# Patient Record
Sex: FEMALE | Race: WHITE | NOT HISPANIC OR LATINO | Employment: FULL TIME | URBAN - METROPOLITAN AREA
[De-identification: names, ages, dates, MRNs, and addresses within clinical notes are randomized per-mention and may not be internally consistent; named-entity substitution may affect disease eponyms.]

---

## 2020-03-27 ENCOUNTER — VIRTUAL VISIT (OUTPATIENT)
Dept: FAMILY MEDICINE | Facility: OTHER | Age: 40
End: 2020-03-27

## 2020-03-27 NOTE — PROGRESS NOTES
"Date: 2020 09:25:42  Clinician: Jacinta Knott  Clinician NPI: [id number]  Patient: Kathya Mathews  Patient : 1980  Patient Address: [address] [place] [address], [place] [address]  Patient Phone: [phone]  Visit Protocol: URI  Patient Summary:  Kathya is a 39 year old ( : 1980 ) female who initiated a Visit for COVID-19 (Coronavirus) evaluation and screening. When asked the question \"Please sign me up to receive news, health information and promotions from [employer].\", Kathya responded \"No\".    Kathya states her symptoms started gradually 10-13 days ago. After her symptoms started, they improved and then got worse again.   Her symptoms consist of a sore throat, malaise, and myalgia. Kathya also feels feverish.   Symptom details     Sore throat: Kathya reports having mild throat pain (1-3 on a 10 point pain scale), does not have exudate on her tonsils, and can swallow liquids. She is not sure if the lymph nodes in her neck are enlarged. A rash has not appeared on the skin since the sore throat started.     Temperature: Her current temperature is 97.3 degrees Fahrenheit.      Kathya denies having ear pain, rhinitis, wheezing, cough, nasal congestion, teeth pain, headache, facial pain or pressure, and chills. She also denies taking antibiotic medication for the symptoms, having recent facial or sinus surgery in the past 60 days, and having a sinus infection within the past year. She is not experiencing dyspnea.   Precipitating events  Within the past week, Kathya has not been exposed to someone with strep throat. She has not recently been exposed to someone with influenza. Kathya has been in close contact with the following high risk individuals: people with asthma, heart disease or diabetes and adults 65 or older.   Pertinent COVID-19 (Coronavirus) information  Kathya has not traveled internationally or to the areas where COVID-19 (Coronavirus) is widespread, including cruise ship travel " in the last 14 days before the start of her symptoms.   Kathya has had a close contact with a laboratory-confirmed COVID-19 patient within 14 days of symptom onset. She also has had a close contact with a suspected COVID-19 patient within 14 days of symptom onset. Additional information about contact with COVID-19 (Coronavirus) patient as reported by the patient (free text): My brother in law has tested positive. I have had indirect contact through multiple family members multiple times.   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 She provides direct patient care. She does not live with a healthcare worker.   Pertinent medical history  Kathya does not get yeast infections when she takes antibiotics.   Kathya does not need a return to work/school note.   Weight: 132 lbs   Kathya does not smoke or use smokeless tobacco.   She denies pregnancy and denies breastfeeding. She has menstruated in the past month.   Additional information as reported by the patient (free text): GI upset   Weight: 132 lbs    MEDICATIONS: calcium-vitamin D3-vitamin K oral, iron-vit C-vit B12-folic acid oral, bupropion HCl oral, ALLERGIES: Compazine  Clinician Response:  Dear Kathya,  Based on the information provided, you have acute bacterial sinusitis, also known as a sinus infection. Sinus infections are caused by bacteria or a virus and symptoms are almost always identical. The difference between the 2 types of infections is timing.  Sinus infections start as viral infections and symptoms improve on their own in about 7 days. If symptoms have not improved after 7 days or have even worsened, a bacterial infection may have developed.  Medication information  I am prescribing:     Amoxicillin 500 mg oral tablet. Take 1 tablet by mouth every 8 hours for 7 days. There are no refills with this prescription.   Yeast infections can be a common side effect of antibiotics. The most common symptom  of a yeast infection is itchiness in and around the vagina. Other signs and symptoms include burning, redness, or a thick, white vaginal discharge that looks like cottage cheese and does not have a bad smell.  If you become pregnant during this course of treatment, stop taking the medication and contact your primary care provider.  Self care  Steps you can take to be as comfortable as possible:     Rest.    Drink plenty of water and other liquids.    Use throat lozenges.    Gargle with warm salt water (1/4 teaspoon of salt per 8 ounce glass of water).    Suck on frozen items such as popsicles or ice cubes.    Drink hot tea with lemon and honey.     When to seek care  Please be seen in a clinic or urgent care if any of the following occur:     Symptoms do not start to improve after 3 days of treatment    New symptoms develop, or symptoms become worse     Please be seen in a clinic or urgent care if new symptoms develop, or symptoms become worse.  It is possible to have an allergic reaction to an antibiotic even if you have not had one in the past. If you notice a new rash, significant swelling, or difficulty breathing, stop taking this medication immediately and go to a clinic or urgent care.  Call 911 or go to the emergency room if you feel that your throat is closing off, you suddenly develop a rash, you are unable to swallow fluids, you are drooling, or you are having difficulty breathing.  Additional treatment plan   Based on the information you have provided, you do have symptoms that are consistent with Coronavirus (COVID-19).  The coronavirus causes mild to severe respiratory illness with the most common symptoms including fever, cough and difficulty breathing. Unfortunately, many viruses cause similar symptoms and it can be difficult to distinguish between viruses, especially in mild cases, so we are presuming that anyone with cough or fever has coronavirus at this time.  Coronavirus/COVID-19 has reached the  point of community spread in [place], meaning that we are finding the virus in people with no known exposure risk for nigel the virus. Given the increasing commonness of coronavirus in the community we are no longer testing patients who are not critically ill.  If you are a health care worker, you should refer to your employee health office for instructions about testing and returning to work.  For everyone else who has cough or fever, you should assume you are infected with coronavirus. Since you will not be tested but have symptoms that may be consistent with coronavirus, the CDC recommends you stay in self-isolation until these three things have happened:    You have had no fever for at least 72 hours (that is three full days of no fever without the use of medicine that reduces fevers)    AND   Other symptoms have improved (for example, when your cough or shortness of breath have improved)   AND   At least 7 days have passed since your symptoms first appeared.   How to Isolate:   Isolate yourself at home.  Do Not allow any visitors  Do Not go to work or school  Do Not go to [facility],  centers, shopping, or other public places.  Do Not shake hands.  Avoid close contact with others (hugging, kissing).   Protect Others:   Cover Your Mouth and Nose with a mask, disposable tissue or wash cloth to avoid spreading germs to others.  Wash your hands and face frequently with soap and water.   We know it can be scary to hear that you might have COVID-19. Our team can help track your symptoms and make sure you are doing ok over the next two weeks using a program called [employer] to keep in touch. When you receive an email from [employer], please consider enrolling in our monitoring program. There is no cost to you for monitoring. Here is a URL where you can learn more: [email or web address]  Managing Symptoms:   At this time, we primarily recommend Tylenol (Acetaminophen) for fever or pain. If  you have liver or kidney problems, contact your primary care provider for instructions on use of tylenol. Adults can take 650 mg (two 325 mg pills) by mouth every 4-6 hours as needed OR 1,000 mg (two 500 mg pills) every 8 hours as needed. MAXIMUM DAILY DOSE: 3,000mg. For children, refer to dosing on bottle based on age or weight.   If you develop significant shortness of breath that prevents you from doing normal activities, please call 911 or proceed to the nearest emergency room and alert them immediately that you have been in self-isolation for possible coronavirus.  If you have a higher risk medical condition such as cancer, heart failure, end stage renal disease on dialysis or have a transplant, please reach out to your specialist's clinic to advise them of your [facility] visit should you not improve within the next two days.   For more information about COVID19 and options for caring for yourself at home, please visit the CDC website at https://www.cdc.gov/coronavirus/2019-ncov/about/steps-when-sick.htmlFor more options for care at [facility], please visit our website at [email or web address]    Diagnosis: Cough  Diagnosis ICD: R05  Prescription: amoxicillin 500 mg oral tablet 21 tablet, 7 days supply. Take 1 tablet by mouth every 8 hours for 7 days. Refills: 0, Refill as needed: no, Allow substitutions: yes

## 2020-05-04 ENCOUNTER — TELEPHONE (OUTPATIENT)
Dept: ORTHOPEDICS | Facility: CLINIC | Age: 40
End: 2020-05-04

## 2020-05-04 ENCOUNTER — MYC MEDICAL ADVICE (OUTPATIENT)
Dept: ORTHOPEDICS | Facility: CLINIC | Age: 40
End: 2020-05-04

## 2020-05-04 NOTE — TELEPHONE ENCOUNTER
Patient was scheduled by  for 5/6/20. Patient reports left hip pain for 2 or 3 weeks with running. She reports no injury and has never hand this pain before. She has not previously seen a physician or had previous treatment. No images completed. She reports minimal to no pain at rest or with walking. She only has pain with running. She is used to running 80 hours per week. She denies radiating pain or numbness and tingling. Patient was offered an initial video visit to establish treatment.     Joshua Sparks ATC, LAT

## 2020-05-06 ENCOUNTER — VIRTUAL VISIT (OUTPATIENT)
Dept: ORTHOPEDICS | Facility: CLINIC | Age: 40
End: 2020-05-06
Payer: COMMERCIAL

## 2020-05-06 ENCOUNTER — ANCILLARY PROCEDURE (OUTPATIENT)
Dept: GENERAL RADIOLOGY | Facility: CLINIC | Age: 40
End: 2020-05-06
Attending: FAMILY MEDICINE
Payer: COMMERCIAL

## 2020-05-06 VITALS — HEIGHT: 68 IN | BODY MASS INDEX: 19.7 KG/M2 | WEIGHT: 130 LBS

## 2020-05-06 DIAGNOSIS — M25.552 LEFT HIP PAIN: ICD-10-CM

## 2020-05-06 DIAGNOSIS — M25.552 LEFT HIP PAIN: Primary | ICD-10-CM

## 2020-05-06 PROCEDURE — 99202 OFFICE O/P NEW SF 15 MIN: CPT | Mod: 95 | Performed by: FAMILY MEDICINE

## 2020-05-06 ASSESSMENT — MIFFLIN-ST. JEOR: SCORE: 1303.18

## 2020-05-06 NOTE — PROGRESS NOTES
"Kathya Mathews is a 39 year old female who is being evaluated via a billable video visit.      The patient has been notified of following:     \"This video visit will be conducted via a call between you and your physician/provider. We have found that certain health care needs can be provided without the need for an in-person physical exam.  This service lets us provide the care you need with a video conversation.  If a prescription is necessary we can send it directly to your pharmacy.  If lab work is needed we can place an order for that and you can then stop by our lab to have the test done at a later time.    Video visits are billed at different rates depending on your insurance coverage.  Please reach out to your insurance provider with any questions.    If during the course of the call the physician/provider feels a video visit is not appropriate, you will not be charged for this service.\"    Patient has given verbal consent for Video visit? Yes    How would you like to obtain your AVS? Khadra    Patient would like the video invitation sent by: Text to cell phone: [phone]    Will anyone else be joining your video visit? No      SUBJECTIVE  Kathya Mathews is a/an 39 year old female who is seen as a self referral for evaluation of left hip pain. She tried to continue running. 80 miles per week for 2 months when the foot pain started.  Pt restarted running in 8-9/19.  Went up from 70->80 mile.    Onset: 6 week(s) ago. Patient describes injury as a running injury  Location of Pain: left anterior hip - sometime radiating down the thigh.    Pt had Covid like sx  Rating of Pain at worst: 7/10  Worsened by: running: full extension after toe off - rebound pain after standing on the leg to put socks on, after activity, in the morning.  Pt can run stairs with a small amount of pain for an hour.  Some pain with ambulation.  Pain with push up.  No pain with resisted flexion.    Better with: rest and \"warms " "up\" with activity,   Treatments tried: resting for a couple weeks, buprofen (helped initially), rolling out over lateral and posterior hip, TENs unit  Quality: aching, sharp  Associated symptoms: no distal numbness or tingling; denies swelling or warmth  Orthopedic history: NO  Relevant surgical history: Sx to right foot 5/2019  No past medical history on file.    HO Stress Fx:  HO stress fx x2 when running at the [employer]  Weight Changes:  No sig changes  LMP: Pt has HO couple of mon Dec/Jan but regular since.  Usually pretty regular    Pt concerned that she cannot run.  Pt was doing some hip work including abduction.     Assessment/Plan: Pt is a 38 yo f presenting for a 6 wk HO left ant hip pain preventing her from running.  Pt  has HO stress fractures in college, none recently with some menstrual irregularities with IUD in place.  Given this concern for possible stress fx.  Hip XR showing mild degenerative changes.  Differential including labral pathology vs stress fx vs mild OA.  Given pt is a high mileage runner will plan to order left hip arthrogram.  Will contact pt with results with plan pending results.  Pt understands and agrees with plan.    Jalen Khan MD        Video-Visit Details    Type of service:  Video Visit    Video Start Time: 9:17 AM  Video End Time: 9:38 AM    Originating Location (pt. Location): Home    Distant Location (provider location):  [facility]     Platform used for Video Visit: Radha Khan MD          "

## 2020-05-06 NOTE — PATIENT INSTRUCTIONS
Diagnosis: Left Hip Pain  Image Findings: Plan for X-Ray    Treatment: Can do stairs as tolerated, home exercises,  Job: No restrictions  Medications: Limited tylenol/ibuprofen for pain for 1-2 weeks  Follow-up: Will call with x-ray results, plan to order MRI to further eval if neg    [email or web address]    MRI Scheduling Appointments  Call: [phone]  Toll-Free: [phone]  Fax: [phone]

## 2020-05-14 ENCOUNTER — TELEPHONE (OUTPATIENT)
Dept: ORTHOPEDICS | Facility: CLINIC | Age: 40
End: 2020-05-14

## 2020-05-14 ENCOUNTER — HOSPITAL ENCOUNTER (OUTPATIENT)
Dept: MRI IMAGING | Facility: CLINIC | Age: 40
End: 2020-05-14
Attending: FAMILY MEDICINE
Payer: COMMERCIAL

## 2020-05-14 ENCOUNTER — HOSPITAL ENCOUNTER (OUTPATIENT)
Dept: GENERAL RADIOLOGY | Facility: CLINIC | Age: 40
End: 2020-05-14
Attending: FAMILY MEDICINE
Payer: COMMERCIAL

## 2020-05-14 VITALS — SYSTOLIC BLOOD PRESSURE: 109 MMHG | OXYGEN SATURATION: 100 % | HEART RATE: 63 BPM | DIASTOLIC BLOOD PRESSURE: 83 MMHG

## 2020-05-14 DIAGNOSIS — M84.352A STRESS FRACTURE OF NECK OF LEFT FEMUR: Primary | ICD-10-CM

## 2020-05-14 DIAGNOSIS — M25.552 LEFT HIP PAIN: ICD-10-CM

## 2020-05-14 PROCEDURE — 25000125 ZZHC RX 250: Performed by: FAMILY MEDICINE

## 2020-05-14 PROCEDURE — 27093 INJECTION FOR HIP X-RAY: CPT

## 2020-05-14 PROCEDURE — A9585 GADOBUTROL INJECTION: HCPCS | Performed by: FAMILY MEDICINE

## 2020-05-14 PROCEDURE — 25000128 H RX IP 250 OP 636: Performed by: FAMILY MEDICINE

## 2020-05-14 PROCEDURE — 73722 MRI JOINT OF LWR EXTR W/DYE: CPT | Mod: LT

## 2020-05-14 PROCEDURE — 25500064 ZZH RX 255 OP 636: Performed by: FAMILY MEDICINE

## 2020-05-14 RX ORDER — EPINEPHRINE 1 MG/ML
0.1 INJECTION, SOLUTION, CONCENTRATE INTRAVENOUS ONCE
Status: COMPLETED | OUTPATIENT
Start: 2020-05-14 | End: 2020-05-14

## 2020-05-14 RX ORDER — IOPAMIDOL 408 MG/ML
10 INJECTION, SOLUTION INTRATHECAL ONCE
Status: COMPLETED | OUTPATIENT
Start: 2020-05-14 | End: 2020-05-14

## 2020-05-14 RX ORDER — GADOBUTROL 604.72 MG/ML
0.1 INJECTION INTRAVENOUS ONCE
Status: COMPLETED | OUTPATIENT
Start: 2020-05-14 | End: 2020-05-14

## 2020-05-14 RX ORDER — LIDOCAINE HYDROCHLORIDE 10 MG/ML
30 INJECTION, SOLUTION EPIDURAL; INFILTRATION; INTRACAUDAL; PERINEURAL ONCE
Status: COMPLETED | OUTPATIENT
Start: 2020-05-14 | End: 2020-05-14

## 2020-05-14 RX ADMIN — GADOBUTROL 0.1 MG: 604.72 INJECTION INTRAVENOUS at 12:10

## 2020-05-14 RX ADMIN — EPINEPHRINE 0.1 MG: 1 INJECTION, SOLUTION, CONCENTRATE INTRAVENOUS at 12:11

## 2020-05-14 RX ADMIN — LIDOCAINE HYDROCHLORIDE 2 ML: 10 INJECTION, SOLUTION EPIDURAL; INFILTRATION; INTRACAUDAL; PERINEURAL at 11:50

## 2020-05-14 RX ADMIN — IOPAMIDOL 4 ML: 408 INJECTION, SOLUTION INTRATHECAL at 12:09

## 2020-05-14 NOTE — TELEPHONE ENCOUNTER
Patient contacted via telephone regarding left hip MRI results.  Given findings plan to treat with rest from running for total of 4 weeks.  Plan to have pt follow-up with PCP for lab studies.  Recommend possible evaluation with a nutritionist given she has had menstrual irregularities as well as femoral neck stress fracture.  Will follow-up in two weeks.      Jalen Khan MD

## 2020-05-15 ENCOUNTER — TELEPHONE (OUTPATIENT)
Dept: ORTHOPEDICS | Facility: CLINIC | Age: 40
End: 2020-05-15

## 2020-05-15 DIAGNOSIS — M79.671 RIGHT FOOT PAIN: Primary | ICD-10-CM

## 2020-05-15 DIAGNOSIS — M84.352A STRESS FRACTURE OF NECK OF LEFT FEMUR: ICD-10-CM

## 2020-05-15 NOTE — TELEPHONE ENCOUNTER
Pt contacted regarding f/u on right foot pain.  She did see someone in 2/20 for this no imaging but concern for stress fracture given recent left femoral neck stress fx.  Given this will order right foot xr and follow-up with results afterwards.  Pt planning to see PCP for lab work up given HO stress fx.  She was advised to possibly get a DEXA scan as well.  Pt understands and agrees with plan.    Jalen Khan MD

## 2020-05-19 ENCOUNTER — ANCILLARY PROCEDURE (OUTPATIENT)
Dept: GENERAL RADIOLOGY | Facility: CLINIC | Age: 40
End: 2020-05-19
Attending: FAMILY MEDICINE
Payer: COMMERCIAL

## 2020-05-19 DIAGNOSIS — M79.671 RIGHT FOOT PAIN: ICD-10-CM

## 2020-05-19 LAB — RADIOLOGIST FLAGS: NORMAL

## 2020-05-20 ENCOUNTER — TELEPHONE (OUTPATIENT)
Dept: ORTHOPEDICS | Facility: CLINIC | Age: 40
End: 2020-05-20

## 2020-05-20 DIAGNOSIS — S92.331A CLOSED DISPLACED FRACTURE OF THIRD METATARSAL BONE OF RIGHT FOOT, INITIAL ENCOUNTER: Primary | ICD-10-CM

## 2020-05-20 NOTE — TELEPHONE ENCOUNTER
Contacted pt regarding findings of right 3rd metatarsal fx.  Order for post-op shoe vs short CAM boot placed.  Pt advised that increased stress on left hip may require crutches.  Plan to f/u in 1-2 weeks to go over symptoms.  Pt understands and agrees with plan.    Jalen Khan MD

## 2020-05-28 ENCOUNTER — OFFICE VISIT (OUTPATIENT)
Dept: ORTHOPEDICS | Facility: CLINIC | Age: 40
End: 2020-05-28
Payer: COMMERCIAL

## 2020-05-28 DIAGNOSIS — S92.331A CLOSED DISPLACED FRACTURE OF THIRD METATARSAL BONE OF RIGHT FOOT, INITIAL ENCOUNTER: Primary | ICD-10-CM

## 2020-05-28 DIAGNOSIS — M84.352A STRESS FRACTURE OF NECK OF LEFT FEMUR: ICD-10-CM

## 2020-05-28 PROCEDURE — 99214 OFFICE O/P EST MOD 30 MIN: CPT | Performed by: FAMILY MEDICINE

## 2020-05-28 NOTE — PATIENT INSTRUCTIONS
Diagnosis: Left hip stress fracture, right 3rd metatarsal fracture  Treatment: No running, no biking, swimming if possible, ski erg with no hip flexion  Follow-up with PCP for Vit D, BMP, CBC, TSH, DEXA scan  Follow-up with nutritionist    Follow-up: 2 weeks    It was great seeing you again today!    Jalen Khan

## 2020-05-28 NOTE — LETTER
5/28/2020         RE: Kathya Mathews  [address]  [facility] [address]        Dear Colleague,    Thank you for referring your patient, Kathya Mathews, to the [facility]. Please see a copy of my visit note below.    ASSESSMENT & PLAN  Kathya was seen today for pain.    Diagnoses and all orders for this visit:    Closed displaced fracture of third metatarsal bone of right foot, initial encounter    Stress fracture of neck of left femur      Patient is a 39 year old female presenting for follow-up of   Chief Complaint   Patient presents with     Left Hip - Pain      # Left femoral neck stress fracture:  Notable after running a significant amount over the past several months followed but running a fair amount of stairs as well.  Hip MRI on 5/14/20 showing active compression sided femoral neck stress fracture.  No pain on examination today.  Pt is a high mileage runner and will likely need 8 weeks of rest prior to resumption of running.      # Right Displaced 3rd Metatarsal Stress Fracture: Evidence of healing on XR last week.  Pt still has TTP over fx site as well as pos metatarsal squeeze.  Plan to cont boot for 2 weeks and reevaluate.     # Relative Energy Deficiency:  Pt has had HO disordered eating in college.  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  Pt advised again today to seek advice from a nutritionist to safely increase caloric intake to compensate for the high mileage running.  She also was recommended again to undergo lab/testing to evaluate for sequelae including at least CBC, CMP, TSH, Vit D as well as a DEXA scan.  Pt wants to have this done by her PCP.  She was also asked about mental health including depression/anxiety.  Pt reported sx of depression after finding out she had a femoral neck stress fracture but wants     Treatment: Cont CAM boot, consider repeat MRI for left femoral neck  "stress fracture, metabolic work-up as above recommended  Activity: Pt can start ski erg workouts  Physical Therapy plan to start when healed  Medications none    Concerning signs/sx that would warrant urgent evaluation were discussed.  All questions were answered, patient understands and agrees with plan.      Return in about 2 weeks (around 6/11/2020).    -----    SUBJECTIVE  Kathya Mathews is a/an 39 year old female who is seen as a self referral for evaluation of left hip pain. The patient is seen by themselves.  Pt has HO stress fractures last one in college 20 yrs ago.  Pt had been ramping up running earlier this year from 70-80 mi/week.  She had seen a nutritionist last year x1 but no management of diet to compensate for the large energy demands of the amount of running she was doing.    Onset: 8 week(s) ago. Patient describes injury as running injury pain noted around mid-Feb  Location of Pain: left hip, right foot   Rating of Pain at worst:significant  Rating of Pain Currently: 0/10  Worsened by: running: full extension after toe off - rebound pain after standing on the leg to put socks on, after activity, in the morning.  Pt can run stairs with a small amount of pain for an hour.  Some pain with ambulation.  Pain with push up.  No pain with resisted flexion.    Better with: rest and \"warms up\" with activity  Treatments tried: resting for a couple weeks, buprofen (helped initially), rolling out over lateral and posterior hip, TENs unit  Quality: aching, sharp  Associated symptoms: no distal numbness or tingling; denies swelling or warmth  Orthopedic history: YES - HO Stress Fx:  HO stress fx x2 when running at the [facility]  Relevant surgical history: YES -right foot sx 5/2019    Interim History - May 28, 2020  Since last visit on 5/20/2020 patient has no left hip pain, no pain while in CAM boot.  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  No interim injury.   Pt has " not had blood work or nutrition evaluation scheduled in the interim.  Pt would like to start running again as soon as safely possible.  She is hoping to start light biking    History reviewed. No pertinent past medical history.  Social History     Socioeconomic History     Marital status: Single     Spouse name: Not on file     Number of children: Not on file     Years of education: Not on file     Highest education level: Not on file   Occupational History     Not on file   Social Needs     Financial resource strain: Not on file     Food insecurity     Worry: Not on file     Inability: Not on file     Transportation needs     Medical: Not on file     Non-medical: Not on file   Tobacco Use     Smoking status: Never Smoker     Smokeless tobacco: Never Used   Substance and Sexual Activity     Alcohol use: Not on file     Drug use: Not on file     Sexual activity: Not on file   Lifestyle     Physical activity     Days per week: Not on file     Minutes per session: Not on file     Stress: Not on file   Relationships     Social connections     Talks on phone: Not on file     Gets together: Not on file     Attends [employer] service: Not on file     Active member of club or organization: Not on file     Attends meetings of clubs or organizations: Not on file     Relationship status: Not on file     Intimate partner violence     Fear of current or ex partner: Not on file     Emotionally abused: Not on file     Physically abused: Not on file     Forced sexual activity: Not on file   Other Topics Concern     Not on file   Social History Narrative     Not on file         Patient's past medical, surgical, social, and family histories were reviewed today and no changes are noted.    REVIEW OF SYSTEMS:  10 point ROS is negative other than symptoms noted above in HPI, Past Medical History or as stated below  Constitutional: NEGATIVE for fever, chills, change in weight  Skin: NEGATIVE for worrisome rashes, moles or lesions  GI/:  NEGATIVE for bowel or bladder changes  Neuro: NEGATIVE for weakness, dizziness or paresthesias    OBJECTIVE:  There were no vitals taken for this visit.   General: healthy, alert and in no distress  HEENT: no scleral icterus or conjunctival erythema  Skin: no suspicious lesions or rash. No jaundice.  CV: no pedal edema  Resp: normal respiratory effort without conversational dyspnea   Psych: normal mood and affect  Gait: normal steady gait with appropriate coordination and balance  Neuro: Normal light sensory exam of lower extremity  MSK:  LEFT HIP  Inspection:    No obvious deformity or asymmetry, level pelvis  Palpation:    Nontender.  Active Range of Motion:     Flexion full, IR full, ER  full  Strength:    Flexion 5/5, adduction 5/5, abduction 5/5  Special Tests:    Positive: None    Negative: Logroll, MYRIAM, anterior impingement (FADIR)    RIGHT FOOT  Inspection:    no swelling    Palpation:    Tender about the 3rd metatarsals    No tenderness over the other metatarsals  Range of Motion:     Active toe flexion and extension  - full    Active ankle range of motion - full     Passive ankle range of motion - full  Strength:    Intrinsic foot musculature strength - full    Ankle strength - full  Special Tests:    Positive: MTP stress    Negative: calcaneal squeeze and Lisfranc joint tenderness        Independent visualization of the below image:  No results found for this or any previous visit (from the past 24 hour(s)).    Exam: 3 views of the right foot dated 5/19 2020.     COMPARISON: None.     CLINICAL HISTORY: Right foot pain.     FINDINGS: AP, oblique, and lateral views of the right foot were  obtained. Postsurgical in the right first metatarsal and right great  toe proximal phalanx. Slight lateral displacement of the right great  toe proximal phalanx in relation to the distal aspect of the right  first metatarsal. There is hallux valgus. Fracture involving the  proximal to mid third metatarsal. Partially  visualized plate and screw  fixation in the distal fibula. Os trigonum. The Lisfranc joint is  congruent.                                                                      IMPRESSION:  1. Healing fracture involving the proximal to mid right third  metatarsal.  2. Hallux valgus with surgical changes in the first metatarsal and  great toe proximal phalanx.     [Consider Follow Up: Fracture involving the third metatarsal.]     This report will be copied to the [facility] to ensure a  provider acknowledges the finding.      LAYLA LOVE MD    Patient's conditions were thoroughly discussed during today's visit with greater than 50% of the visit spent counseling the patient with total time spent face-to-face with the patient being 30 minutes.    Jalen Khan MD, [facility]      Again, thank you for allowing me to participate in the care of your patient.        Sincerely,        Jalen Khan MD

## 2020-05-28 NOTE — PROGRESS NOTES
ASSESSMENT & PLAN  Kathya was seen today for pain.    Diagnoses and all orders for this visit:    Closed displaced fracture of third metatarsal bone of right foot, initial encounter    Stress fracture of neck of left femur      Patient is a 39 year old female presenting for follow-up of   Chief Complaint   Patient presents with     Left Hip - Pain      # Left femoral neck stress fracture:  Notable after running a significant amount over the past several months followed but running a fair amount of stairs as well.  Hip MRI on 5/14/20 showing active compression sided femoral neck stress fracture.  No pain on examination today.  Pt is a high mileage runner and will likely need 8 weeks of rest prior to resumption of running.      # Right Displaced 3rd Metatarsal Stress Fracture: Evidence of healing on XR last week.  Pt still has TTP over fx site as well as pos metatarsal squeeze.  Plan to cont boot for 2 weeks and reevaluate.     # Relative Energy Deficiency:  Pt has had HO disordered eating in college.  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  Pt advised again today to seek advice from a nutritionist to safely increase caloric intake to compensate for the high mileage running.  She also was recommended again to undergo lab/testing to evaluate for sequelae including at least CBC, CMP, TSH, Vit D as well as a DEXA scan.  Pt wants to have this done by her PCP.  She was also asked about mental health including depression/anxiety.  Pt reported sx of depression after finding out she had a femoral neck stress fracture but wants     Treatment: Cont CAM boot, consider repeat MRI for left femoral neck stress fracture, metabolic work-up as above recommended  Activity: Pt can start ski erg workouts  Physical Therapy plan to start when healed  Medications none    Concerning signs/sx that would warrant urgent evaluation were discussed.  All questions were answered,  "patient understands and agrees with plan.      Return in about 2 weeks (around 6/11/2020).    -----    SUBJECTIVE  Kathya Mathews is a/an 39 year old female who is seen as a self referral for evaluation of left hip pain. The patient is seen by themselves.  Pt has HO stress fractures last one in college 20 yrs ago.  Pt had been ramping up running earlier this year from 70-80 mi/week.  She had seen a nutritionist last year x1 but no management of diet to compensate for the large energy demands of the amount of running she was doing.    Onset: 8 week(s) ago. Patient describes injury as running injury pain noted around mid-Feb  Location of Pain: left hip, right foot   Rating of Pain at worst:significant  Rating of Pain Currently: 0/10  Worsened by: running: full extension after toe off - rebound pain after standing on the leg to put socks on, after activity, in the morning.  Pt can run stairs with a small amount of pain for an hour.  Some pain with ambulation.  Pain with push up.  No pain with resisted flexion.    Better with: rest and \"warms up\" with activity  Treatments tried: resting for a couple weeks, buprofen (helped initially), rolling out over lateral and posterior hip, TENs unit  Quality: aching, sharp  Associated symptoms: no distal numbness or tingling; denies swelling or warmth  Orthopedic history: YES - HO Stress Fx:  HO stress fx x2 when running at the [facility]  Relevant surgical history: YES -right foot sx 5/2019    Interim History - May 28, 2020  Since last visit on 5/20/2020 patient has no left hip pain, no pain while in CAM boot.  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  No interim injury.   Pt has not had blood work or nutrition evaluation scheduled in the interim.  Pt would like to start running again as soon as safely possible.  She is hoping to start light biking    History reviewed. No pertinent past medical history.  Social History     Socioeconomic " History     Marital status: Single     Spouse name: Not on file     Number of children: Not on file     Years of education: Not on file     Highest education level: Not on file   Occupational History     Not on file   Social Needs     Financial resource strain: Not on file     Food insecurity     Worry: Not on file     Inability: Not on file     Transportation needs     Medical: Not on file     Non-medical: Not on file   Tobacco Use     Smoking status: Never Smoker     Smokeless tobacco: Never Used   Substance and Sexual Activity     Alcohol use: Not on file     Drug use: Not on file     Sexual activity: Not on file   Lifestyle     Physical activity     Days per week: Not on file     Minutes per session: Not on file     Stress: Not on file   Relationships     Social connections     Talks on phone: Not on file     Gets together: Not on file     Attends Baptism service: Not on file     Active member of club or organization: Not on file     Attends meetings of clubs or organizations: Not on file     Relationship status: Not on file     Intimate partner violence     Fear of current or ex partner: Not on file     Emotionally abused: Not on file     Physically abused: Not on file     Forced sexual activity: Not on file   Other Topics Concern     Not on file   Social History Narrative     Not on file         Patient's past medical, surgical, social, and family histories were reviewed today and no changes are noted.    REVIEW OF SYSTEMS:  10 point ROS is negative other than symptoms noted above in HPI, Past Medical History or as stated below  Constitutional: NEGATIVE for fever, chills, change in weight  Skin: NEGATIVE for worrisome rashes, moles or lesions  GI/: NEGATIVE for bowel or bladder changes  Neuro: NEGATIVE for weakness, dizziness or paresthesias    OBJECTIVE:  There were no vitals taken for this visit.   General: healthy, alert and in no distress  HEENT: no scleral icterus or conjunctival erythema  Skin: no  suspicious lesions or rash. No jaundice.  CV: no pedal edema  Resp: normal respiratory effort without conversational dyspnea   Psych: normal mood and affect  Gait: normal steady gait with appropriate coordination and balance  Neuro: Normal light sensory exam of lower extremity  MSK:  LEFT HIP  Inspection:    No obvious deformity or asymmetry, level pelvis  Palpation:    Nontender.  Active Range of Motion:     Flexion full, IR full, ER  full  Strength:    Flexion 5/5, adduction 5/5, abduction 5/5  Special Tests:    Positive: None    Negative: Logroll, MYRIAM, anterior impingement (FADIR)    RIGHT FOOT  Inspection:    no swelling    Palpation:    Tender about the 3rd metatarsals    No tenderness over the other metatarsals  Range of Motion:     Active toe flexion and extension  - full    Active ankle range of motion - full     Passive ankle range of motion - full  Strength:    Intrinsic foot musculature strength - full    Ankle strength - full  Special Tests:    Positive: MTP stress    Negative: calcaneal squeeze and Lisfranc joint tenderness        Independent visualization of the below image:  No results found for this or any previous visit (from the past 24 hour(s)).    Exam: 3 views of the right foot dated 5/19 2020.     COMPARISON: None.     CLINICAL HISTORY: Right foot pain.     FINDINGS: AP, oblique, and lateral views of the right foot were  obtained. Postsurgical in the right first metatarsal and right great  toe proximal phalanx. Slight lateral displacement of the right great  toe proximal phalanx in relation to the distal aspect of the right  first metatarsal. There is hallux valgus. Fracture involving the  proximal to mid third metatarsal. Partially visualized plate and screw  fixation in the distal fibula. Os trigonum. The Lisfranc joint is  congruent.                                                                      IMPRESSION:  1. Healing fracture involving the proximal to mid right  third  metatarsal.  2. Hallux valgus with surgical changes in the first metatarsal and  great toe proximal phalanx.     [Consider Follow Up: Fracture involving the third metatarsal.]     This report will be copied to the [facility] to ensure a  provider acknowledges the finding.      LAYLA LOVE MD    Patient's conditions were thoroughly discussed during today's visit with greater than 50% of the visit spent counseling the patient with total time spent face-to-face with the patient being 30 minutes.    Jalen Khan MD, [facility]

## 2020-06-11 ENCOUNTER — OFFICE VISIT (OUTPATIENT)
Dept: ORTHOPEDICS | Facility: CLINIC | Age: 40
End: 2020-06-11
Payer: COMMERCIAL

## 2020-06-11 ENCOUNTER — ANCILLARY PROCEDURE (OUTPATIENT)
Dept: GENERAL RADIOLOGY | Facility: CLINIC | Age: 40
End: 2020-06-11
Attending: FAMILY MEDICINE
Payer: COMMERCIAL

## 2020-06-11 DIAGNOSIS — M84.352A STRESS FRACTURE OF LEFT HIP: ICD-10-CM

## 2020-06-11 DIAGNOSIS — S92.331A CLOSED DISPLACED FRACTURE OF THIRD METATARSAL BONE OF RIGHT FOOT, INITIAL ENCOUNTER: Primary | ICD-10-CM

## 2020-06-11 DIAGNOSIS — S92.331A CLOSED DISPLACED FRACTURE OF THIRD METATARSAL BONE OF RIGHT FOOT, INITIAL ENCOUNTER: ICD-10-CM

## 2020-06-11 PROCEDURE — 99213 OFFICE O/P EST LOW 20 MIN: CPT | Performed by: FAMILY MEDICINE

## 2020-06-11 PROCEDURE — 73630 X-RAY EXAM OF FOOT: CPT | Mod: RT

## 2020-06-11 NOTE — LETTER
6/11/2020         RE: Kathya Mathews  [address]  [facility] [address]        Dear Colleague,    Thank you for referring your patient, Kathya Mathews, to the [facility]. Please see a copy of my visit note below.    ASSESSMENT & PLAN  aKthya was seen today for follow up and follow up.    Diagnoses and all orders for this visit:    Closed displaced fracture of third metatarsal bone of right foot, initial encounter  -     XR Foot RT G/E 3 vw; Future  -     PHYSICAL THERAPY REFERRAL (External-Prints); Future    Stress fracture of left hip  -     PHYSICAL THERAPY REFERRAL (External-Prints); Future      Patient is a 39 year old female presenting for follow-up of   Chief Complaint   Patient presents with     Right Foot - Follow Up     Left Hip - Follow Up      # Left femoral neck stress fracture:  Notable after running a significant amount over the past several months followed but running a fair amount of stairs as well.  Hip MRI on 5/14/20 showing active compression sided femoral neck stress fracture.  No pain on examination today.  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  Pt understands and agrees with plan      # Right Displaced 3rd Metatarsal Stress Fracture: Evidence of healing on foot XR on 5/19/20.  No pain over fx site today, XR showing interval healing.  Plan to have pt transition out of boot, can start biking PRN.     # Relative Energy Deficiency:  Pt has had HO disordered eating in college.  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  Lab work as well as DEXA scan neg.  Pt to see nutrition prior to restarting running.     Treatment: Transition out of CAM boot.  Can restart biking as long as pain free.  Consider repeat MRI for left femoral neck stress fracture, metabolic work-up as above recommended  Activity: Pt can  "start start biking  Physical Therapy plan to start when healed  Medications none    Concerning signs/sx that would warrant urgent evaluation were discussed.  All questions were answered, patient understands and agrees with plan.      Return in about 1 month (around 7/11/2020).    -----    SUBJECTIVE  Kathya Mathews is a/an 39 year old female who is seen as a self referral for evaluation of left hip pain. The patient is seen by themselves.  Pt has HO stress fractures last one in college 20 yrs ago.  Pt had been ramping up running earlier this year from 70-80 mi/week.  She had seen a nutritionist last year x1 but no management of diet to compensate for the large energy demands of the amount of running she was doing.    Onset: 8 week(s) ago. Patient describes injury as running injury pain noted around mid-Feb  Location of Pain: left hip, right foot   Rating of Pain at worst:significant  Rating of Pain Currently: 0/10  Worsened by: running: full extension after toe off - rebound pain after standing on the leg to put socks on, after activity, in the morning.  Pt can run stairs with a small amount of pain for an hour.  Some pain with ambulation.  Pain with push up.  No pain with resisted flexion.    Better with: rest and \"warms up\" with activity  Treatments tried: resting for a couple weeks, buprofen (helped initially), rolling out over lateral and posterior hip, TENs unit  Quality: aching, sharp  Associated symptoms: no distal numbness or tingling; denies swelling or warmth  Orthopedic history: YES - HO Stress Fx:  HO stress fx x2 when running at the [facility]  Relevant surgical history: YES -right foot sx 5/2019    Interim History - May 28, 2020  Since last visit on 5/20/2020 patient has no left hip pain, no pain while in CAM boot.  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  No interim injury.   Pt has not had blood work or nutrition evaluation scheduled in the interim.  Pt would " like to start running again as soon as safely possible.  She is hoping to start light biking    Interim History - June 11, 2020  Since last visit on 5/28/2020 patient has no pain while in CAM boot, no left hip pain.  No interim injury.         History reviewed. No pertinent past medical history.  Social History     Socioeconomic History     Marital status: Single     Spouse name: Not on file     Number of children: Not on file     Years of education: Not on file     Highest education level: Not on file   Occupational History     Not on file   Social Needs     Financial resource strain: Not on file     Food insecurity     Worry: Not on file     Inability: Not on file     Transportation needs     Medical: Not on file     Non-medical: Not on file   Tobacco Use     Smoking status: Never Smoker     Smokeless tobacco: Never Used   Substance and Sexual Activity     Alcohol use: Not on file     Drug use: Not on file     Sexual activity: Not on file   Lifestyle     Physical activity     Days per week: Not on file     Minutes per session: Not on file     Stress: Not on file   Relationships     Social connections     Talks on phone: Not on file     Gets together: Not on file     Attends Mandaeism service: Not on file     Active member of club or organization: Not on file     Attends meetings of clubs or organizations: Not on file     Relationship status: Not on file     Intimate partner violence     Fear of current or ex partner: Not on file     Emotionally abused: Not on file     Physically abused: Not on file     Forced sexual activity: Not on file   Other Topics Concern     Not on file   Social History Narrative     Not on file       Patient's past medical, surgical, social, and family histories were reviewed today and no changes are noted.    REVIEW OF SYSTEMS:  10 point ROS is negative other than symptoms noted above in HPI, Past Medical History or as stated below  Constitutional: NEGATIVE for fever, chills, change in  weight  Skin: NEGATIVE for worrisome rashes, moles or lesions  GI/: NEGATIVE for bowel or bladder changes  Neuro: NEGATIVE for weakness, dizziness or paresthesias    OBJECTIVE:  There were no vitals taken for this visit.   General: healthy, alert and in no distress  HEENT: no scleral icterus or conjunctival erythema  Skin: no suspicious lesions or rash. No jaundice.  CV: no pedal edema  Resp: normal respiratory effort without conversational dyspnea   Psych: normal mood and affect  Gait: normal steady gait with appropriate coordination and balance  Neuro: Normal light sensory exam of lower extremity  MSK:  LEFT HIP  Inspection:    No obvious deformity or asymmetry, level pelvis  Palpation:    Nontender.  Active Range of Motion:     Flexion full, IR full, ER  full  Strength:    Flexion 5/5, adduction 5/5, abduction 5/5  Special Tests:    Positive: None    Negative: SARITHA GutierrezER, anterior impingement (FADIR)    RIGHT FOOT  Inspection:    no swelling    Palpation:    No TTP about the 3rd metatarsals    No tenderness over the other metatarsals  Range of Motion:     Active toe flexion and extension  - full    Active ankle range of motion - full     Passive ankle range of motion - full  Strength:    Intrinsic foot musculature strength - full    Ankle strength - full  Special Tests:    Positive: None    Negative: calcaneal squeeze and Lisfranc joint tenderness, MTP stress      Independent visualization of the below image:  No results found for this or any previous visit (from the past 24 hour(s)).    Exam: 3 views of the right foot dated 5/19 2020.     COMPARISON: None.     CLINICAL HISTORY: Right foot pain.     FINDINGS: AP, oblique, and lateral views of the right foot were  obtained. Postsurgical in the right first metatarsal and right great  toe proximal phalanx. Slight lateral displacement of the right great  toe proximal phalanx in relation to the distal aspect of the right  first metatarsal. There is hallux  valgus. Fracture involving the  proximal to mid third metatarsal. Partially visualized plate and screw  fixation in the distal fibula. Os trigonum. The Lisfranc joint is  congruent.                                                                      IMPRESSION:  1. Healing fracture involving the proximal to mid right third  metatarsal.  2. Hallux valgus with surgical changes in the first metatarsal and  great toe proximal phalanx.     [Consider Follow Up: Fracture involving the third metatarsal.]     This report will be copied to the [facility] to ensure a  provider acknowledges the finding.      MD Jalen COELLO MD, [facility]      Again, thank you for allowing me to participate in the care of your patient.        Sincerely,        Jalen Khan MD

## 2020-06-11 NOTE — PATIENT INSTRUCTIONS
Diagnosis: Left hip stress fracture, right foot stress fracture  Image Findings: evidence of healing  Treatment: Transition out of boot, can row, bike, swim   Medications: None  Follow-up: As needed    It was great seeing you again today!    Jalen Khan

## 2020-06-11 NOTE — PROGRESS NOTES
ASSESSMENT & PLAN  Kathya was seen today for follow up and follow up.    Diagnoses and all orders for this visit:    Closed displaced fracture of third metatarsal bone of right foot, initial encounter  -     XR Foot RT G/E 3 vw; Future  -     PHYSICAL THERAPY REFERRAL (External-Prints); Future    Stress fracture of left hip  -     PHYSICAL THERAPY REFERRAL (External-Prints); Future      Patient is a 39 year old female presenting for follow-up of   Chief Complaint   Patient presents with     Right Foot - Follow Up     Left Hip - Follow Up      # Left femoral neck stress fracture:  Notable after running a significant amount over the past several months followed but running a fair amount of stairs as well.  Hip MRI on 5/14/20 showing active compression sided femoral neck stress fracture.  No pain on examination today.  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  Pt understands and agrees with plan      # Right Displaced 3rd Metatarsal Stress Fracture: Evidence of healing on foot XR on 5/19/20.  No pain over fx site today, XR showing interval healing.  Plan to have pt transition out of boot, can start biking PRN.     # Relative Energy Deficiency:  Pt has had HO disordered eating in college.  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  Lab work as well as DEXA scan neg.  Pt to see nutrition prior to restarting running.     Treatment: Transition out of CAM boot.  Can restart biking as long as pain free.  Consider repeat MRI for left femoral neck stress fracture, metabolic work-up as above recommended  Activity: Pt can start start biking  Physical Therapy plan to start when healed  Medications none    Concerning signs/sx that would warrant urgent evaluation were discussed.  All questions were answered, patient understands and agrees with plan.      Return in about 1 month (around  "7/11/2020).    -----    SUBJECTIVE  Kathya Mathews is a/an 39 year old female who is seen as a self referral for evaluation of left hip pain. The patient is seen by themselves.  Pt has HO stress fractures last one in college 20 yrs ago.  Pt had been ramping up running earlier this year from 70-80 mi/week.  She had seen a nutritionist last year x1 but no management of diet to compensate for the large energy demands of the amount of running she was doing.    Onset: 8 week(s) ago. Patient describes injury as running injury pain noted around mid-Feb  Location of Pain: left hip, right foot   Rating of Pain at worst:significant  Rating of Pain Currently: 0/10  Worsened by: running: full extension after toe off - rebound pain after standing on the leg to put socks on, after activity, in the morning.  Pt can run stairs with a small amount of pain for an hour.  Some pain with ambulation.  Pain with push up.  No pain with resisted flexion.    Better with: rest and \"warms up\" with activity  Treatments tried: resting for a couple weeks, buprofen (helped initially), rolling out over lateral and posterior hip, TENs unit  Quality: aching, sharp  Associated symptoms: no distal numbness or tingling; denies swelling or warmth  Orthopedic history: YES - HO Stress Fx:  HO stress fx x2 when running at the [facility]  Relevant surgical history: YES -right foot sx 5/2019    Interim History - May 28, 2020  Since last visit on 5/20/2020 patient has no left hip pain, no pain while in CAM boot.  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  No interim injury.   Pt has not had blood work or nutrition evaluation scheduled in the interim.  Pt would like to start running again as soon as safely possible.  She is hoping to start light biking    Interim History - June 11, 2020  Since last visit on 5/28/2020 patient has no pain while in CAM boot, no left hip pain.  No interim injury.         History reviewed. No " pertinent past medical history.  Social History     Socioeconomic History     Marital status: Single     Spouse name: Not on file     Number of children: Not on file     Years of education: Not on file     Highest education level: Not on file   Occupational History     Not on file   Social Needs     Financial resource strain: Not on file     Food insecurity     Worry: Not on file     Inability: Not on file     Transportation needs     Medical: Not on file     Non-medical: Not on file   Tobacco Use     Smoking status: Never Smoker     Smokeless tobacco: Never Used   Substance and Sexual Activity     Alcohol use: Not on file     Drug use: Not on file     Sexual activity: Not on file   Lifestyle     Physical activity     Days per week: Not on file     Minutes per session: Not on file     Stress: Not on file   Relationships     Social connections     Talks on phone: Not on file     Gets together: Not on file     Attends Latter-day service: Not on file     Active member of club or organization: Not on file     Attends meetings of clubs or organizations: Not on file     Relationship status: Not on file     Intimate partner violence     Fear of current or ex partner: Not on file     Emotionally abused: Not on file     Physically abused: Not on file     Forced sexual activity: Not on file   Other Topics Concern     Not on file   Social History Narrative     Not on file       Patient's past medical, surgical, social, and family histories were reviewed today and no changes are noted.    REVIEW OF SYSTEMS:  10 point ROS is negative other than symptoms noted above in HPI, Past Medical History or as stated below  Constitutional: NEGATIVE for fever, chills, change in weight  Skin: NEGATIVE for worrisome rashes, moles or lesions  GI/: NEGATIVE for bowel or bladder changes  Neuro: NEGATIVE for weakness, dizziness or paresthesias    OBJECTIVE:  There were no vitals taken for this visit.   General: healthy, alert and in no  distress  HEENT: no scleral icterus or conjunctival erythema  Skin: no suspicious lesions or rash. No jaundice.  CV: no pedal edema  Resp: normal respiratory effort without conversational dyspnea   Psych: normal mood and affect  Gait: normal steady gait with appropriate coordination and balance  Neuro: Normal light sensory exam of lower extremity  MSK:  LEFT HIP  Inspection:    No obvious deformity or asymmetry, level pelvis  Palpation:    Nontender.  Active Range of Motion:     Flexion full, IR full, ER  full  Strength:    Flexion 5/5, adduction 5/5, abduction 5/5  Special Tests:    Positive: None    Negative: Logroll, MYRIAM, anterior impingement (FADIR)    RIGHT FOOT  Inspection:    no swelling    Palpation:    No TTP about the 3rd metatarsals    No tenderness over the other metatarsals  Range of Motion:     Active toe flexion and extension  - full    Active ankle range of motion - full     Passive ankle range of motion - full  Strength:    Intrinsic foot musculature strength - full    Ankle strength - full  Special Tests:    Positive: None    Negative: calcaneal squeeze and Lisfranc joint tenderness, MTP stress      Independent visualization of the below image:  No results found for this or any previous visit (from the past 24 hour(s)).    Exam: 3 views of the right foot dated 5/19 2020.     COMPARISON: None.     CLINICAL HISTORY: Right foot pain.     FINDINGS: AP, oblique, and lateral views of the right foot were  obtained. Postsurgical in the right first metatarsal and right great  toe proximal phalanx. Slight lateral displacement of the right great  toe proximal phalanx in relation to the distal aspect of the right  first metatarsal. There is hallux valgus. Fracture involving the  proximal to mid third metatarsal. Partially visualized plate and screw  fixation in the distal fibula. Os trigonum. The Lisfranc joint is  congruent.                                                                       IMPRESSION:  1. Healing fracture involving the proximal to mid right third  metatarsal.  2. Hallux valgus with surgical changes in the first metatarsal and  great toe proximal phalanx.     [Consider Follow Up: Fracture involving the third metatarsal.]     This report will be copied to the [facility] to ensure a  provider acknowledges the finding.      MD Jalen COELLO MD, [facility]

## 2020-09-03 ENCOUNTER — THERAPY VISIT (OUTPATIENT)
Dept: PHYSICAL THERAPY | Facility: CLINIC | Age: 40
End: 2020-09-03
Attending: FAMILY MEDICINE
Payer: COMMERCIAL

## 2020-09-03 DIAGNOSIS — M84.352A STRESS FRACTURE OF LEFT HIP: ICD-10-CM

## 2020-09-03 DIAGNOSIS — S92.331A CLOSED DISPLACED FRACTURE OF THIRD METATARSAL BONE OF RIGHT FOOT, INITIAL ENCOUNTER: ICD-10-CM

## 2020-09-03 PROCEDURE — 97161 PT EVAL LOW COMPLEX 20 MIN: CPT | Mod: GP | Performed by: PHYSICAL THERAPIST

## 2020-09-03 PROCEDURE — 97112 NEUROMUSCULAR REEDUCATION: CPT | Mod: GP | Performed by: PHYSICAL THERAPIST

## 2020-09-03 NOTE — PROGRESS NOTES
LOV: 7/26/16  NOV: None seen    Pt needs to make an appointment with PCP for further refills.    [facility] Initial Evaluation  Subjective:  The history is provided by the patient. No  was used.   Patient Health History  Kathya Mathews being seen for R foot and L hip.     Problem began: 6/11/2020 (date of referral).   Problem occurred: running   Pain is reported as 0/10 on pain scale.  General health as reported by patient is excellent.  Pertinent medical history includes: none.   Red flags:  None as reported by patient.  Medical allergies: none.   Surgeries include:  Orthopedic surgery. Other surgery history details: R bunionectomy.    Current medications:  None.    Current occupation is pain medicine fellow.   Primary job tasks include:  Prolonged sitting and prolonged standing.                  Therapist Generated HPI Evaluation  Problem details: Diagnosed with femoral neck and 3rd met stress fractures in May.  This was after increasing her mileage for her runs by about 10 miles a week.  Stopped running for about 3 months and all the pain is gone.  Slowly bringing herself back but her runs just don't feel right and there is likely some weakness in the legs.         Type of problem:  Left hip (R foot).    This is a chronic condition.  Condition occurred with:  Repetition/overuse.    Patient reports pain:  Anterior.    Pain radiates to:  No radiation.     Associated symptoms:  Loss of strength. Symptoms are exacerbated by running  and relieved by rest.                              Objective:  Standing Alignment:                Ankle/foot deviations: L overpronation.                                                     Hip Evaluation  HIP AROM:  AROM:    Left Hip:     Normal    Right Hip:   Normal                    Hip Strength:  : psoas tested in supine.    Flexion:   Left: 3+/5   -  Pain:  Right: 5/5   -  Pain:                    Extension:  Left: 4-/5  -  Pain:Right: 4-/5    -  Pain:    Abduction:  Left: 4-/5    -   Pain:Right: 5/5   -   Pain:        Knee  Flexion:  Left: 5/5   -  Pain:Right: 5/5   -  Pain:  Knee Extension:  Left: 5/5   -  Pain:Right: 5/5    -  Pain:            Functional Testing:          Quad:    Single leg squat:   Left:    Moderate loss of control, femoral IR and decreased hip/trunk flexion  Right:   Moderate loss of control and femoral IR                     General     ROS    Assessment/Plan:    Patient is a 40 year old female with left hip and right foot complaints.    Patient has the following significant findings with corresponding treatment plan.                Diagnosis 1:  Hip and foot weakness s/p stress fxs  Decreased strength - therapeutic exercise, therapeutic activities and home program  Decreased proprioception - neuro re-education, therapeutic activities and home program  Impaired muscle performance - neuro re-education and home program  Decreased function - therapeutic activities and home program    Cumulative Therapy Evaluation is: Low complexity.    Previous and current functional limitations:  (See Goal Flow Sheet for this information)    Short term and Long term goals: (See Goal Flow Sheet for this information)     Communication ability:  Patient appears to be able to clearly communicate and understand verbal and written communication and follow directions correctly.  Treatment Explanation - The following has been discussed with the patient:   RX ordered/plan of care  Anticipated outcomes  Possible risks and side effects  This patient would benefit from PT intervention to resume normal activities.   Rehab potential is good.    Frequency:  2 X a month, once daily  Duration:  for 2 months  Discharge Plan:  Achieve all LTG.  Independent in home treatment program.  Reach maximal therapeutic benefit.    Please refer to the daily flowsheet for treatment today, total treatment time and time spent performing 1:1 timed codes.

## 2020-09-08 ENCOUNTER — OFFICE VISIT (OUTPATIENT)
Dept: ORTHOPEDICS | Facility: CLINIC | Age: 40
End: 2020-09-08
Payer: COMMERCIAL

## 2020-09-08 ENCOUNTER — ANCILLARY PROCEDURE (OUTPATIENT)
Dept: GENERAL RADIOLOGY | Facility: CLINIC | Age: 40
End: 2020-09-08
Attending: FAMILY MEDICINE
Payer: COMMERCIAL

## 2020-09-08 DIAGNOSIS — S92.331A CLOSED DISPLACED FRACTURE OF THIRD METATARSAL BONE OF RIGHT FOOT, INITIAL ENCOUNTER: Primary | ICD-10-CM

## 2020-09-08 DIAGNOSIS — S92.331A CLOSED DISPLACED FRACTURE OF THIRD METATARSAL BONE OF RIGHT FOOT, INITIAL ENCOUNTER: ICD-10-CM

## 2020-09-08 PROCEDURE — 99213 OFFICE O/P EST LOW 20 MIN: CPT | Performed by: FAMILY MEDICINE

## 2020-09-08 PROCEDURE — 73630 X-RAY EXAM OF FOOT: CPT | Mod: RT

## 2020-09-08 NOTE — LETTER
9/8/2020         RE: Kathya Mathews  [address]  [facility] [id number]        Dear Colleague,    Thank you for referring your patient, Kathya Mathews, to the [facility]. Please see a copy of my visit note below.    ASSESSMENT & PLAN  Kathya was seen today for follow up and follow up.    Diagnoses and all orders for this visit:    Closed displaced fracture of third metatarsal bone of right foot, initial encounter  -     XR Foot Right G/E 3 Views; Future      Patient is a 39 year old female presenting for follow-up of   Chief Complaint   Patient presents with     Right Foot - Follow Up     Left Hip - Follow Up      # Left femoral neck stress fracture:  Notable after running a significant amount over the past several months followed but running a fair amount of stairs as well.  Hip MRI on 5/14/20 showing active compression sided femoral neck stress fracture.  No pain on examination today.  Pt starting PT now, has been off running until last month.  Given this will have pt return to running with PT and f/u PRN    # Right Displaced 3rd Metatarsal Stress Fracture: Evidence of healing on foot XR on 5/19/20.  No pain over fx site today, XR showing continued interval healing.  Pt to restart running slowly and work with PT.      # Relative Energy Deficiency:  Pt has had HO disordered eating in college.  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  Lab work as well as DEXA scan neg.  Pt encouraged again to see nutrition prior to restarting running.     Treatment: Gradual return to running, follow-up if progressive pain is again noted.  Follow-up with nutritionist.  Activity: Pt can cont to run  Physical Therapy Continue  Medications none    Concerning signs/sx that would warrant urgent evaluation were discussed.  All questions were answered, patient understands and agrees with plan.      Return if symptoms worsen or  "fail to improve.    -----    SUBJECTIVE  Kathya Mathews is a/an 39 year old female who is seen as a self referral for evaluation of left hip pain. The patient is seen by themselves.  Pt has HO stress fractures last one in college 20 yrs ago.  Pt had been ramping up running earlier this year from 70-80 mi/week.  She had seen a nutritionist last year x1 but no management of diet to compensate for the large energy demands of the amount of running she was doing.    Onset: 8 week(s) ago. Patient describes injury as running injury pain noted around mid-Feb  Location of Pain: left hip, right foot   Rating of Pain at worst:significant  Rating of Pain Currently: 0/10  Worsened by: running: full extension after toe off - rebound pain after standing on the leg to put socks on, after activity, in the morning.  Pt can run stairs with a small amount of pain for an hour.  Some pain with ambulation.  Pain with push up.  No pain with resisted flexion.    Better with: rest and \"warms up\" with activity  Treatments tried: resting for a couple weeks, buprofen (helped initially), rolling out over lateral and posterior hip, TENs unit  Quality: aching, sharp  Associated symptoms: no distal numbness or tingling; denies swelling or warmth  Orthopedic history: YES - HO Stress Fx:  HO stress fx x2 when running at the [facility]  Relevant surgical history: YES -right foot sx 5/2019    Interim History - May 28, 2020  Since last visit on 5/20/2020 patient has no left hip pain, no pain while in CAM boot.  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  No interim injury.   Pt has not had blood work or nutrition evaluation scheduled in the interim.  Pt would like to start running again as soon as safely possible.  She is hoping to start light biking    Interim History - June 11, 2020  Since last visit on 5/28/2020 patient has no pain while in CAM boot, no left hip pain.  No interim injury.       Interim History - " September 8, 2020  Since last visit on 6/11/2020 patient has improved symptoms in left hip and right foot. .  Patient is 5 months out from left hip diagnosis and 4 months out for right foot.  No interim injury.  Pt has been running intermittently mild stiffness over foot but no significant pain.  No hip pain.         History reviewed. No pertinent past medical history.  Social History     Socioeconomic History     Marital status: Single     Spouse name: Not on file     Number of children: Not on file     Years of education: Not on file     Highest education level: Not on file   Occupational History     Not on file   Social Needs     Financial resource strain: Not on file     Food insecurity     Worry: Not on file     Inability: Not on file     Transportation needs     Medical: Not on file     Non-medical: Not on file   Tobacco Use     Smoking status: Never Smoker     Smokeless tobacco: Never Used   Substance and Sexual Activity     Alcohol use: Not on file     Drug use: Not on file     Sexual activity: Not on file   Lifestyle     Physical activity     Days per week: Not on file     Minutes per session: Not on file     Stress: Not on file   Relationships     Social connections     Talks on phone: Not on file     Gets together: Not on file     Attends Protestant service: Not on file     Active member of club or organization: Not on file     Attends meetings of clubs or organizations: Not on file     Relationship status: Not on file     Intimate partner violence     Fear of current or ex partner: Not on file     Emotionally abused: Not on file     Physically abused: Not on file     Forced sexual activity: Not on file   Other Topics Concern     Not on file   Social History Narrative     Not on file       Patient's past medical, surgical, social, and family histories were reviewed today and no changes are noted.    REVIEW OF SYSTEMS:  10 point ROS is negative other than symptoms noted above in HPI, Past Medical History or  as stated below  Constitutional: NEGATIVE for fever, chills, change in weight  Skin: NEGATIVE for worrisome rashes, moles or lesions  GI/: NEGATIVE for bowel or bladder changes  Neuro: NEGATIVE for weakness, dizziness or paresthesias    OBJECTIVE:  There were no vitals taken for this visit.   General: healthy, alert and in no distress  HEENT: no scleral icterus or conjunctival erythema  Skin: no suspicious lesions or rash. No jaundice.  CV: no pedal edema  Resp: normal respiratory effort without conversational dyspnea   Psych: normal mood and affect  Gait: normal steady gait with appropriate coordination and balance  Neuro: Normal light sensory exam of lower extremity  MSK:  LEFT HIP  Inspection:    No obvious deformity or asymmetry, level pelvis  Palpation:    Nontender.  Active Range of Motion:     Flexion full, IR full, ER  full  Strength:    Flexion 5/5, adduction 5/5, abduction 5/5  Special Tests:    Positive: None    Negative: Logroll, MYRIAM, anterior impingement (FADIR)    RIGHT FOOT  Inspection:    no swelling    Palpation:  Very mild TTP about the 3rd metatarsals    No tenderness over the other metatarsals  Range of Motion:     Active toe flexion and extension  - full    Active ankle range of motion - full     Passive ankle range of motion - full  Strength:    Intrinsic foot musculature strength - full    Ankle strength - full  Special Tests:    Positive: None    Negative: calcaneal squeeze and Lisfranc joint tenderness, MTP stress      Independent visualization of the below image:  No results found for this or any previous visit (from the past 24 hour(s)).    RIGHT FOOT THREE OR MORE VIEWS    9/8/2020 3:03 PM      HISTORY: Closed displaced fracture of third metatarsal bone of right  foot, initial encounter.     COMPARISON: 6/11/2020.                                                                      IMPRESSION: Changes from distal first metatarsal osteotomy and bunion  shaving as well as proximal  phalanx great toe osteotomy with hardware  in place. There is also ORIF of the distal fibula. Healed fracture  base of the third metatarsal. No significant change.     RAMON BARTH MD    Exam: 3 views of the right foot dated 5/19 2020.     COMPARISON: None.     CLINICAL HISTORY: Right foot pain.     FINDINGS: AP, oblique, and lateral views of the right foot were  obtained. Postsurgical in the right first metatarsal and right great  toe proximal phalanx. Slight lateral displacement of the right great  toe proximal phalanx in relation to the distal aspect of the right  first metatarsal. There is hallux valgus. Fracture involving the  proximal to mid third metatarsal. Partially visualized plate and screw  fixation in the distal fibula. Os trigonum. The Lisfranc joint is  congruent.                                                                      IMPRESSION:  1. Healing fracture involving the proximal to mid right third  metatarsal.  2. Hallux valgus with surgical changes in the first metatarsal and  great toe proximal phalanx.     [Consider Follow Up: Fracture involving the third metatarsal.]     This report will be copied to the [facility] to ensure a  provider acknowledges the finding.      MD Jalen COELLO MD, [facility]      Again, thank you for allowing me to participate in the care of your patient.        Sincerely,        Jalen Khan MD

## 2020-09-08 NOTE — PATIENT INSTRUCTIONS
Diagnosis: Right 3rd Metatarsal Stress Fracture  Image Findings: evidence of healing  Treatment: slowly ramp up running, alternate biking  Job: no restrictions   Medications: none  Follow-up: As needed     Please call [phone]   Ask for my team if you have any questions or concerns    It was great seeing you today!    Jalen Khan

## 2020-09-08 NOTE — PROGRESS NOTES
ASSESSMENT & PLAN  Kahtya was seen today for follow up and follow up.    Diagnoses and all orders for this visit:    Closed displaced fracture of third metatarsal bone of right foot, initial encounter  -     XR Foot Right G/E 3 Views; Future      Patient is a 39 year old female presenting for follow-up of   Chief Complaint   Patient presents with     Right Foot - Follow Up     Left Hip - Follow Up      # Left femoral neck stress fracture:  Notable after running a significant amount over the past several months followed but running a fair amount of stairs as well.  Hip MRI on 5/14/20 showing active compression sided femoral neck stress fracture.  No pain on examination today.  Pt starting PT now, has been off running until last month.  Given this will have pt return to running with PT and f/u PRN    # Right Displaced 3rd Metatarsal Stress Fracture: Evidence of healing on foot XR on 5/19/20.  No pain over fx site today, XR showing continued interval healing.  Pt to restart running slowly and work with PT.      # Relative Energy Deficiency:  Pt has had HO disordered eating in college.  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  Lab work as well as DEXA scan neg.  Pt encouraged again to see nutrition prior to restarting running.     Treatment: Gradual return to running, follow-up if progressive pain is again noted.  Follow-up with nutritionist.  Activity: Pt can cont to run  Physical Therapy Continue  Medications none    Concerning signs/sx that would warrant urgent evaluation were discussed.  All questions were answered, patient understands and agrees with plan.      Return if symptoms worsen or fail to improve.    -----    SUBJECTIVE  Kathya Mathews is a/an 39 year old female who is seen as a self referral for evaluation of left hip pain. The patient is seen by themselves.  Pt has HO stress fractures last one in college 20 yrs ago.  Pt had been ramping  "up running earlier this year from 70-80 mi/week.  She had seen a nutritionist last year x1 but no management of diet to compensate for the large energy demands of the amount of running she was doing.    Onset: 8 week(s) ago. Patient describes injury as running injury pain noted around mid-Feb  Location of Pain: left hip, right foot   Rating of Pain at worst:significant  Rating of Pain Currently: 0/10  Worsened by: running: full extension after toe off - rebound pain after standing on the leg to put socks on, after activity, in the morning.  Pt can run stairs with a small amount of pain for an hour.  Some pain with ambulation.  Pain with push up.  No pain with resisted flexion.    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  No interim injury.   Pt has not had blood work or nutrition evaluation scheduled in the interim.  Pt would like to start running again as soon as safely possible.  She is hoping to start light biking    Interim History - June 11, 2020  Since last visit on 5/28/2020 patient has no pain while in CAM boot, no left hip pain.  No interim injury.       Interim History - September 8, 2020  Since last visit on 6/11/2020 patient has improved symptoms in left hip and right foot. .  Patient is 5 months out from left hip diagnosis and 4 months out for right foot.  No interim injury.  Pt has been running intermittently mild stiffness over " foot but no significant pain.  No hip pain.         History reviewed. No pertinent past medical history.  Social History     Socioeconomic History     Marital status: Single     Spouse name: Not on file     Number of children: Not on file     Years of education: Not on file     Highest education level: Not on file   Occupational History     Not on file   Social Needs     Financial resource strain: Not on file     Food insecurity     Worry: Not on file     Inability: Not on file     Transportation needs     Medical: Not on file     Non-medical: Not on file   Tobacco Use     Smoking status: Never Smoker     Smokeless tobacco: Never Used   Substance and Sexual Activity     Alcohol use: Not on file     Drug use: Not on file     Sexual activity: Not on file   Lifestyle     Physical activity     Days per week: Not on file     Minutes per session: Not on file     Stress: Not on file   Relationships     Social connections     Talks on phone: Not on file     Gets together: Not on file     Attends Mu-ism service: Not on file     Active member of club or organization: Not on file     Attends meetings of clubs or organizations: Not on file     Relationship status: Not on file     Intimate partner violence     Fear of current or ex partner: Not on file     Emotionally abused: Not on file     Physically abused: Not on file     Forced sexual activity: Not on file   Other Topics Concern     Not on file   Social History Narrative     Not on file       Patient's past medical, surgical, social, and family histories were reviewed today and no changes are noted.    REVIEW OF SYSTEMS:  10 point ROS is negative other than symptoms noted above in HPI, Past Medical History or as stated below  Constitutional: NEGATIVE for fever, chills, change in weight  Skin: NEGATIVE for worrisome rashes, moles or lesions  GI/: NEGATIVE for bowel or bladder changes  Neuro: NEGATIVE for weakness, dizziness or paresthesias    OBJECTIVE:  There were no  vitals taken for this visit.   General: healthy, alert and in no distress  HEENT: no scleral icterus or conjunctival erythema  Skin: no suspicious lesions or rash. No jaundice.  CV: no pedal edema  Resp: normal respiratory effort without conversational dyspnea   Psych: normal mood and affect  Gait: normal steady gait with appropriate coordination and balance  Neuro: Normal light sensory exam of lower extremity  MSK:  LEFT HIP  Inspection:    No obvious deformity or asymmetry, level pelvis  Palpation:    Nontender.  Active Range of Motion:     Flexion full, IR full, ER  full  Strength:    Flexion 5/5, adduction 5/5, abduction 5/5  Special Tests:    Positive: None    Negative: Logroll, MYRIAM, anterior impingement (FADIR)    RIGHT FOOT  Inspection:    no swelling    Palpation:  Very mild TTP about the 3rd metatarsals    No tenderness over the other metatarsals  Range of Motion:     Active toe flexion and extension  - full    Active ankle range of motion - full     Passive ankle range of motion - full  Strength:    Intrinsic foot musculature strength - full    Ankle strength - full  Special Tests:    Positive: None    Negative: calcaneal squeeze and Lisfranc joint tenderness, MTP stress      Independent visualization of the below image:  No results found for this or any previous visit (from the past 24 hour(s)).    RIGHT FOOT THREE OR MORE VIEWS    9/8/2020 3:03 PM      HISTORY: Closed displaced fracture of third metatarsal bone of right  foot, initial encounter.     COMPARISON: 6/11/2020.                                                                      IMPRESSION: Changes from distal first metatarsal osteotomy and bunion  shaving as well as proximal phalanx great toe osteotomy with hardware  in place. There is also ORIF of the distal fibula. Healed fracture  base of the third metatarsal. No significant change.     RAMON BARTH MD    Exam: 3 views of the right foot dated 5/19 2020.     COMPARISON:  None.     CLINICAL HISTORY: Right foot pain.     FINDINGS: AP, oblique, and lateral views of the right foot were  obtained. Postsurgical in the right first metatarsal and right great  toe proximal phalanx. Slight lateral displacement of the right great  toe proximal phalanx in relation to the distal aspect of the right  first metatarsal. There is hallux valgus. Fracture involving the  proximal to mid third metatarsal. Partially visualized plate and screw  fixation in the distal fibula. Os trigonum. The Lisfranc joint is  congruent.                                                                      IMPRESSION:  1. Healing fracture involving the proximal to mid right third  metatarsal.  2. Hallux valgus with surgical changes in the first metatarsal and  great toe proximal phalanx.     [Consider Follow Up: Fracture involving the third metatarsal.]     This report will be copied to the [facility] to ensure a  provider acknowledges the finding.      MD Jalen COELLO MD, [facility]

## 2020-11-14 ENCOUNTER — HEALTH MAINTENANCE LETTER (OUTPATIENT)
Age: 40
End: 2020-11-14

## 2020-11-23 ENCOUNTER — OFFICE VISIT (OUTPATIENT)
Dept: ORTHOPEDICS | Facility: CLINIC | Age: 40
End: 2020-11-23
Payer: COMMERCIAL

## 2020-11-23 DIAGNOSIS — M84.352A STRESS FRACTURE OF LEFT HIP: Primary | ICD-10-CM

## 2020-11-23 PROCEDURE — 99214 OFFICE O/P EST MOD 30 MIN: CPT | Performed by: FAMILY MEDICINE

## 2020-11-23 NOTE — PROGRESS NOTES
ASSESSMENT & PLAN  Diagnoses and all orders for this visit:    Stress fracture of left hip  -     MR Hip Left w/o Contrast; Future      Patient is a 39 year old female presenting for follow-up of   No chief complaint on file.     # Left femoral neck stress fracture:  Notable after running a significant amount over the past several months followed but running a fair amount of stairs as well.  Hip MRI on 5/14/20 showing active compression sided femoral neck stress fracture.  Pt returned to running after resting for 3 mon now doing 40-50 miles per week with posterior hip pain worse with running and sitting.  Pt has min/no pain over ischial tuberosity, more over posterior hip with mildly pos FADIR.  Concern that recurring pain could be 2/2 stress fracture vs hamstring tendinopathy.  Plan to repeat left hip MRI and f/u with the results.  Pt to hold off on painful running in the interim.      # Right Displaced 3rd Metatarsal Stress Fracture: Evidence of healing on foot XR on 5/19/20.  No pain over fx site today, Previous XR showing continued interval healing.  No restrictions concerning foot, avoid running due to pain as above      # Relative Energy Deficiency:  Pt has had HO disordered eating in college.  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  Lab work as well as DEXA scan neg.  Pt talking to nutrition working on getting more protein reports keeping up with her calorie counts.      Treatment:  Avoid running, Left hip MRI ordered  Activity: Painless activities only  Physical Therapy none for now  Medications none    Concerning signs/sx that would warrant urgent evaluation were discussed.  All questions were answered, patient understands and agrees with plan.      Return in about 1 week (around 11/30/2020).    -----    SUBJECTIVE  Kathya Mathews is a/an 39 year old female who is seen as a self referral for evaluation of left hip pain. The patient is seen  "by themselves.  Pt has HO stress fractures last one in college 20 yrs ago.  Pt had been ramping up running earlier this year from 70-80 mi/week.  She had seen a nutritionist last year x1 but no management of diet to compensate for the large energy demands of the amount of running she was doing.    Onset: 8 week(s) ago. Patient describes injury as running injury pain noted around mid-Feb  Location of Pain: left hip, right foot   Rating of Pain at worst:significant  Rating of Pain Currently: 0/10  Worsened by: running: full extension after toe off - rebound pain after standing on the leg to put socks on, after activity, in the morning.  Pt can run stairs with a small amount of pain for an hour.  Some pain with ambulation.  Pain with push up.  No pain with resisted flexion.    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  No interim injury.   Pt has not had blood work or nutrition evaluation scheduled in the interim.  Pt would like to start running again as soon as safely possible.  She is hoping to start light biking    Interim History - June 11, 2020  Since last visit on 5/28/2020 patient has no pain while in CAM boot, no left hip pain.  No interim injury.       Interim History - September 8, 2020  Since last visit on 6/11/2020 patient has improved symptoms in left hip and right foot. .  Patient is 5 months out from left hip diagnosis and 4 months " out for right foot.  No interim injury.  Pt has been running intermittently mild stiffness over foot but no significant pain.  No hip pain.       Interim History - November 23, 2020  Since last visit on 9/8/20 patient has pain in left buttock.  Pt going to PT working on strengthening exercises working on rolling it out.  No interim injury.  Pain's been ongoing for months.  Pt feels tightness worse on the left.  Pt has some lateral knee pain.  Pt tried topical Voltaren that helped.  Pain with sitting.    45-50 miles/week.  Pt has mild pain over right foot but no increase recently, feels it is similar to chronic pain post-op.  Pt seeing nutritionist that has helped.       No past medical history on file.  Social History     Socioeconomic History     Marital status: Single     Spouse name: Not on file     Number of children: Not on file     Years of education: Not on file     Highest education level: Not on file   Occupational History     Not on file   Social Needs     Financial resource strain: Not on file     Food insecurity     Worry: Not on file     Inability: Not on file     Transportation needs     Medical: Not on file     Non-medical: Not on file   Tobacco Use     Smoking status: Never Smoker     Smokeless tobacco: Never Used   Substance and Sexual Activity     Alcohol use: Not on file     Drug use: Not on file     Sexual activity: Not on file   Lifestyle     Physical activity     Days per week: Not on file     Minutes per session: Not on file     Stress: Not on file   Relationships     Social connections     Talks on phone: Not on file     Gets together: Not on file     Attends Hinduism service: Not on file     Active member of club or organization: Not on file     Attends meetings of clubs or organizations: Not on file     Relationship status: Not on file     Intimate partner violence     Fear of current or ex partner: Not on file     Emotionally abused: Not on file     Physically abused: Not on file      Forced sexual activity: Not on file   Other Topics Concern     Not on file   Social History Narrative     Not on file       Patient's past medical, surgical, social, and family histories were reviewed today and no changes are noted.    REVIEW OF SYSTEMS:  10 point ROS is negative other than symptoms noted above in HPI, Past Medical History or as stated below  Constitutional: NEGATIVE for fever, chills, change in weight  Skin: NEGATIVE for worrisome rashes, moles or lesions  GI/: NEGATIVE for bowel or bladder changes  Neuro: NEGATIVE for weakness, dizziness or paresthesias    OBJECTIVE:  There were no vitals taken for this visit.   General: healthy, alert and in no distress  HEENT: no scleral icterus or conjunctival erythema  Skin: no suspicious lesions or rash. No jaundice.  CV: no pedal edema  Resp: normal respiratory effort without conversational dyspnea   Psych: normal mood and affect  Gait: normal steady gait with appropriate coordination and balance  Neuro: Normal light sensory exam of lower extremity  MSK:  LEFT HIP  Inspection:    No obvious deformity or asymmetry, level pelvis  Palpation:  TTP over posterior hip, mild over ischial tuberosity  Active Range of Motion:     Flexion full, IR full, ER  full  Strength:    Flexion 5/5, adduction 5/5, abduction 5/5  Special Tests:    Positive: FADIR    Negative: MYRIAM Gutierrez     RIGHT FOOT  Inspection:    no swelling    Palpation:  No mild TTP about the 3rd metatarsals    No tenderness over the other metatarsals  Range of Motion:     Active toe flexion and extension  - full    Active ankle range of motion - full     Passive ankle range of motion - full  Strength:    Intrinsic foot musculature strength - full    Ankle strength - full  Special Tests:    Positive: None    Negative: calcaneal squeeze and Lisfranc joint tenderness, MTP stress      Independent visualization of the below image:  No results found for this or any previous visit (from the past 24  hour(s)).    RIGHT FOOT THREE OR MORE VIEWS    9/8/2020 3:03 PM      HISTORY: Closed displaced fracture of third metatarsal bone of right  foot, initial encounter.     COMPARISON: 6/11/2020.                                                                      IMPRESSION: Changes from distal first metatarsal osteotomy and bunion  shaving as well as proximal phalanx great toe osteotomy with hardware  in place. There is also ORIF of the distal fibula. Healed fracture  base of the third metatarsal. No significant change.     RAMON BARTH MD    Exam: 3 views of the right foot dated 5/19 2020.     COMPARISON: None.     CLINICAL HISTORY: Right foot pain.     FINDINGS: AP, oblique, and lateral views of the right foot were  obtained. Postsurgical in the right first metatarsal and right great  toe proximal phalanx. Slight lateral displacement of the right great  toe proximal phalanx in relation to the distal aspect of the right  first metatarsal. There is hallux valgus. Fracture involving the  proximal to mid third metatarsal. Partially visualized plate and screw  fixation in the distal fibula. Os trigonum. The Lisfranc joint is  congruent.                                                                      IMPRESSION:  1. Healing fracture involving the proximal to mid right third  metatarsal.  2. Hallux valgus with surgical changes in the first metatarsal and  great toe proximal phalanx.     [Consider Follow Up: Fracture involving the third metatarsal.]     This report will be copied to the [facility] to ensure a  provider acknowledges the finding.      MD Jalen COELLO MD, [facility]

## 2020-11-23 NOTE — LETTER
11/23/2020         RE: Kathya Mathews  [address]  [facility] [address]        Dear Colleague,    Thank you for referring your patient, Kathya Mathews, to the [facility] [place]. Please see a copy of my visit note below.    ASSESSMENT & PLAN  Diagnoses and all orders for this visit:    Stress fracture of left hip  -     MR Hip Left w/o Contrast; Future      Patient is a 39 year old female presenting for follow-up of   No chief complaint on file.     # Left femoral neck stress fracture:  Notable after running a significant amount over the past several months followed but running a fair amount of stairs as well.  Hip MRI on 5/14/20 showing active compression sided femoral neck stress fracture.  Pt returned to running after resting for 3 mon now doing 40-50 miles per week with posterior hip pain worse with running and sitting.  Pt has min/no pain over ischial tuberosity, more over posterior hip with mildly pos FADIR.  Concern that recurring pain could be 2/2 stress fracture vs hamstring tendinopathy.  Plan to repeat left hip MRI and f/u with the results.  Pt to hold off on painful running in the interim.      # Right Displaced 3rd Metatarsal Stress Fracture: Evidence of healing on foot XR on 5/19/20.  No pain over fx site today, Previous XR showing continued interval healing.  No restrictions concerning foot, avoid running due to pain as above      # Relative Energy Deficiency:  Pt has had HO disordered eating in college.  Concern with stress fractures along with irregular menstrual cycles in a high mileage runner for [employer].  Lab work as well as DEXA scan neg.  Pt talking to nutrition working on getting more protein reports keeping up with her calorie counts.      Treatment:  Avoid running, Left hip MRI ordered  Activity: Painless activities only  Physical Therapy none for now  Medications none    Concerning signs/sx that would warrant urgent evaluation were discussed.  " All questions were answered, patient understands and agrees with plan.      Return in about 1 week (around 11/30/2020).    -----    SUBJECTIVE  Kathya Mathews is a/an 39 year old female who is seen as a self referral for evaluation of left hip pain. The patient is seen by themselves.  Pt has HO stress fractures last one in college 20 yrs ago.  Pt had been ramping up running earlier this year from 70-80 mi/week.  She had seen a nutritionist last year x1 but no management of diet to compensate for the large energy demands of the amount of running she was doing.    Onset: 8 week(s) ago. Patient describes injury as running injury pain noted around mid-Feb  Location of Pain: left hip, right foot   Rating of Pain at worst:significant  Rating of Pain Currently: 0/10  Worsened by: running: full extension after toe off - rebound pain after standing on the leg to put socks on, after activity, in the morning.  Pt can run stairs with a small amount of pain for an hour.  Some pain with ambulation.  Pain with push up.  No pain with resisted flexion.    Better with: rest and \"warms up\" with activity  Treatments tried: resting for a couple weeks, buprofen (helped initially), rolling out over lateral and posterior hip, TENs unit  Quality: aching, sharp  Associated symptoms: no distal numbness or tingling; denies swelling or warmth  Orthopedic history: YES - HO Stress Fx:  HO stress fx x2 when running at the [employer] [facility] [employer]  Relevant surgical history: YES -right foot sx 5/2019    Interim History - May 28, 2020  Since last visit on 5/20/2020 patient has no left hip pain, no pain while in CAM boot.  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  No interim injury.   Pt has not had blood work or nutrition evaluation scheduled in the interim.  Pt would like to start running again as soon as safely possible.  She is hoping to start light biking    Interim History - June 11, 2020  Since last visit on " 5/28/2020 patient has no pain while in CAM boot, no left hip pain.  No interim injury.       Interim History - September 8, 2020  Since last visit on 6/11/2020 patient has improved symptoms in left hip and right foot. .  Patient is 5 months out from left hip diagnosis and 4 months out for right foot.  No interim injury.  Pt has been running intermittently mild stiffness over foot but no significant pain.  No hip pain.       Interim History - November 23, 2020  Since last visit on 9/8/20 patient has pain in left buttock.  Pt going to PT working on strengthening exercises working on rolling it out.  No interim injury.  Pain's been ongoing for months.  Pt feels tightness worse on the left.  Pt has some lateral knee pain.  Pt tried topical Voltaren that helped.  Pain with sitting.    45-50 miles/week.  Pt has mild pain over right foot but no increase recently, feels it is similar to chronic pain post-op.  Pt seeing nutritionist that has helped.       No past medical history on file.  Social History     Socioeconomic History     Marital status: Single     Spouse name: Not on file     Number of children: Not on file     Years of education: Not on file     Highest education level: Not on file   Occupational History     Not on file   Social Needs     Financial resource strain: Not on file     Food insecurity     Worry: Not on file     Inability: Not on file     Transportation needs     Medical: Not on file     Non-medical: Not on file   Tobacco Use     Smoking status: Never Smoker     Smokeless tobacco: Never Used   Substance and Sexual Activity     Alcohol use: Not on file     Drug use: Not on file     Sexual activity: Not on file   Lifestyle     Physical activity     Days per week: Not on file     Minutes per session: Not on file     Stress: Not on file   Relationships     Social connections     Talks on phone: Not on file     Gets together: Not on file     Attends Shinto service: Not on file     Active member of club  or organization: Not on file     Attends meetings of clubs or organizations: Not on file     Relationship status: Not on file     Intimate partner violence     Fear of current or ex partner: Not on file     Emotionally abused: Not on file     Physically abused: Not on file     Forced sexual activity: Not on file   Other Topics Concern     Not on file   Social History Narrative     Not on file       Patient's past medical, surgical, social, and family histories were reviewed today and no changes are noted.    REVIEW OF SYSTEMS:  10 point ROS is negative other than symptoms noted above in HPI, Past Medical History or as stated below  Constitutional: NEGATIVE for fever, chills, change in weight  Skin: NEGATIVE for worrisome rashes, moles or lesions  GI/: NEGATIVE for bowel or bladder changes  Neuro: NEGATIVE for weakness, dizziness or paresthesias    OBJECTIVE:  There were no vitals taken for this visit.   General: healthy, alert and in no distress  HEENT: no scleral icterus or conjunctival erythema  Skin: no suspicious lesions or rash. No jaundice.  CV: no pedal edema  Resp: normal respiratory effort without conversational dyspnea   Psych: normal mood and affect  Gait: normal steady gait with appropriate coordination and balance  Neuro: Normal light sensory exam of lower extremity  MSK:  LEFT HIP  Inspection:    No obvious deformity or asymmetry, level pelvis  Palpation:  TTP over posterior hip, mild over ischial tuberosity  Active Range of Motion:     Flexion full, IR full, ER  full  Strength:    Flexion 5/5, adduction 5/5, abduction 5/5  Special Tests:    Positive: FADIR    Negative: MYRIAM Gutierrez     RIGHT FOOT  Inspection:    no swelling    Palpation:  No mild TTP about the 3rd metatarsals    No tenderness over the other metatarsals  Range of Motion:     Active toe flexion and extension  - full    Active ankle range of motion - full     Passive ankle range of motion - full  Strength:    Intrinsic foot  musculature strength - full    Ankle strength - full  Special Tests:    Positive: None    Negative: calcaneal squeeze and Lisfranc joint tenderness, MTP stress      Independent visualization of the below image:  No results found for this or any previous visit (from the past 24 hour(s)).    RIGHT FOOT THREE OR MORE VIEWS    9/8/2020 3:03 PM      HISTORY: Closed displaced fracture of third metatarsal bone of right  foot, initial encounter.     COMPARISON: 6/11/2020.                                                                      IMPRESSION: Changes from distal first metatarsal osteotomy and bunion  shaving as well as proximal phalanx great toe osteotomy with hardware  in place. There is also ORIF of the distal fibula. Healed fracture  base of the third metatarsal. No significant change.     RAMON BARTH MD    Exam: 3 views of the right foot dated 5/19 2020.     COMPARISON: None.     CLINICAL HISTORY: Right foot pain.     FINDINGS: AP, oblique, and lateral views of the right foot were  obtained. Postsurgical in the right first metatarsal and right great  toe proximal phalanx. Slight lateral displacement of the right great  toe proximal phalanx in relation to the distal aspect of the right  first metatarsal. There is hallux valgus. Fracture involving the  proximal to mid third metatarsal. Partially visualized plate and screw  fixation in the distal fibula. Os trigonum. The Lisfranc joint is  congruent.                                                                      IMPRESSION:  1. Healing fracture involving the proximal to mid right third  metatarsal.  2. Hallux valgus with surgical changes in the first metatarsal and  great toe proximal phalanx.     [Consider Follow Up: Fracture involving the third metatarsal.]     This report will be copied to the [facility] to ensure a  provider acknowledges the finding.      MD Jalen COELLO MD, [facility]        Again, thank you for allowing me to participate in the care of your patient.        Sincerely,        Jalen Khan MD

## 2020-11-23 NOTE — PATIENT INSTRUCTIONS
Diagnosis: Left hip pain concern for stress fracture  Image Findings: left hip MRI ordered  Treatment: left hip MRI,   Medications: Limited tylenol/ibuprofen for pain for 1-2 weeks  Follow-up: after MRI     Please call [phone]   Ask for my team if you have any questions or concerns    MRI Scheduling Appointments  Call: [phone]   Toll-Free: [phone]  Fax: [phone]    It was great seeing you again today!    Jalen Khan MD, CAQSM

## 2020-11-24 ENCOUNTER — HOSPITAL ENCOUNTER (OUTPATIENT)
Dept: MRI IMAGING | Facility: CLINIC | Age: 40
Discharge: HOME | End: 2020-11-24
Attending: FAMILY MEDICINE | Admitting: FAMILY MEDICINE
Payer: COMMERCIAL

## 2020-11-24 DIAGNOSIS — M84.352A STRESS FRACTURE OF LEFT HIP: ICD-10-CM

## 2020-11-24 PROCEDURE — 73721 MRI JNT OF LWR EXTRE W/O DYE: CPT | Mod: LT

## 2020-12-04 ENCOUNTER — OFFICE VISIT (OUTPATIENT)
Dept: ORTHOPEDICS | Facility: CLINIC | Age: 40
End: 2020-12-04
Payer: COMMERCIAL

## 2020-12-04 DIAGNOSIS — M25.552 LEFT HIP PAIN: ICD-10-CM

## 2020-12-04 DIAGNOSIS — G57.02 PIRIFORMIS SYNDROME OF LEFT SIDE: Primary | ICD-10-CM

## 2020-12-04 PROCEDURE — 20611 DRAIN/INJ JOINT/BURSA W/US: CPT | Mod: LT | Performed by: FAMILY MEDICINE

## 2020-12-04 RX ORDER — ROPIVACAINE HYDROCHLORIDE 5 MG/ML
2 INJECTION, SOLUTION EPIDURAL; INFILTRATION; PERINEURAL
Status: DISCONTINUED | OUTPATIENT
Start: 2020-12-04 | End: 2023-09-19

## 2020-12-04 RX ORDER — BETAMETHASONE SODIUM PHOSPHATE AND BETAMETHASONE ACETATE 3; 3 MG/ML; MG/ML
6 INJECTION, SUSPENSION INTRA-ARTICULAR; INTRALESIONAL; INTRAMUSCULAR; SOFT TISSUE
Status: DISCONTINUED | OUTPATIENT
Start: 2020-12-04 | End: 2023-09-19

## 2020-12-04 RX ADMIN — BETAMETHASONE SODIUM PHOSPHATE AND BETAMETHASONE ACETATE 6 MG: 3; 3 INJECTION, SUSPENSION INTRA-ARTICULAR; INTRALESIONAL; INTRAMUSCULAR; SOFT TISSUE at 13:35

## 2020-12-04 RX ADMIN — ROPIVACAINE HYDROCHLORIDE 2 ML: 5 INJECTION, SOLUTION EPIDURAL; INFILTRATION; PERINEURAL at 13:35

## 2020-12-04 NOTE — PATIENT INSTRUCTIONS
[facility] [place]    Procedure: Left Piriformis Steroid Injection      You may shower, however avoid swimming, tub baths or hot tubs for 24 hours following your procedure    You may have a mild to moderate increase in pain for several days following the injection.    It may take up to 14 days for the steroid medication to start working although you may feel the effect as early as a few days after the procedure.    You may use ice packs for 10-15 minutes, 3 to 4 times a day at the injection site for comfort    You may use anti-inflammatory medications (such as Ibuprofen or Aleve or Advil) or Tylenol for pain control if necessary    If you were fasting, you may resume your normal diet and medications after the procedure    If you have diabetes, check your blood sugar more frequently than usual as your blood sugar may be higher than normal for 10-14 days following a steroid injection. Contact your doctor who manages your diabetes if your blood sugar is higher than usual      If you experience any of the following, [place] [facility] [phone] or [phone]  -Fever over 100 degree F  -Swelling, bleeding, redness, drainage, warmth at the injection site  - New or worsening pain

## 2020-12-04 NOTE — PROGRESS NOTES
Large Joint Injection/Arthocentesis    Date/Time: 12/4/2020 1:35 PM  Performed by: Jalen Khan MD  Authorized by: Jalen Khan MD     Indications:  Pain  Needle Size:  22 G  Guidance: ultrasound    Approach:  Posterior  Location:  Hip   Location comment:  Left piriformis       Medications:  6 mg betamethasone acet & sod phos 6 (3-3) MG/ML; 2 mL ropivacaine 5 MG/ML  Outcome:  Tolerated well, no immediate complications  Procedure discussed: discussed risks, benefits, and alternatives    Consent Given by:  Patient  Timeout: timeout called immediately prior to procedure    Prep: patient was prepped and draped in usual sterile fashion     Patient reported significant improvement of pain after piriformis steroid injection.  Aftercare instructions given to patient.  Plan to follow-up as previously discussed with referring provider.     Jalen Khan MD [facility]

## 2020-12-04 NOTE — LETTER
12/4/2020         RE: Kathya Mathews  [address] [facility] [facility] [address]        Dear Colleague,    Thank you for referring your patient, Kathya Mathews, to the [facility] [place]. Please see a copy of my visit note below.    Large Joint Injection/Arthocentesis    Date/Time: 12/4/2020 1:35 PM  Performed by: Jalen Khan MD  Authorized by: Jalen Khan MD     Indications:  Pain  Needle Size:  22 G  Guidance: ultrasound    Approach:  Posterior  Location:  Hip   Location comment:  Left piriformis       Medications:  6 mg betamethasone acet & sod phos 6 (3-3) MG/ML; 2 mL ropivacaine 5 MG/ML  Outcome:  Tolerated well, no immediate complications  Procedure discussed: discussed risks, benefits, and alternatives    Consent Given by:  Patient  Timeout: timeout called immediately prior to procedure    Prep: patient was prepped and draped in usual sterile fashion     Patient reported significant improvement of pain after piriformis steroid injection.  Aftercare instructions given to patient.  Plan to follow-up as previously discussed with referring provider.     Jalen Khan MD [facility]                Again, thank you for allowing me to participate in the care of your patient.        Sincerely,        Jalen Khan MD

## 2021-01-08 PROBLEM — S92.331A CLOSED DISPLACED FRACTURE OF THIRD METATARSAL BONE OF RIGHT FOOT, INITIAL ENCOUNTER: Status: RESOLVED | Noted: 2020-09-03 | Resolved: 2021-01-08

## 2021-01-08 PROBLEM — M84.352A STRESS FRACTURE OF LEFT HIP: Status: RESOLVED | Noted: 2020-09-03 | Resolved: 2021-01-08

## 2021-05-12 ENCOUNTER — TELEPHONE (OUTPATIENT)
Dept: FAMILY MEDICINE | Facility: CLINIC | Age: 41
End: 2021-05-12

## 2021-06-07 ENCOUNTER — HOSPITAL ENCOUNTER (OUTPATIENT)
Dept: GENERAL RADIOLOGY | Facility: CLINIC | Age: 41
Discharge: HOME | End: 2021-06-07
Attending: FAMILY MEDICINE | Admitting: FAMILY MEDICINE
Payer: COMMERCIAL

## 2021-06-07 DIAGNOSIS — S92.331A CLOSED DISPLACED FRACTURE OF THIRD METATARSAL BONE OF RIGHT FOOT, INITIAL ENCOUNTER: Primary | ICD-10-CM

## 2021-06-07 DIAGNOSIS — S92.331A CLOSED DISPLACED FRACTURE OF THIRD METATARSAL BONE OF RIGHT FOOT, INITIAL ENCOUNTER: ICD-10-CM

## 2021-06-07 PROCEDURE — 73630 X-RAY EXAM OF FOOT: CPT | Mod: RT

## 2021-06-28 DIAGNOSIS — S92.331A CLOSED DISPLACED FRACTURE OF THIRD METATARSAL BONE OF RIGHT FOOT, INITIAL ENCOUNTER: Primary | ICD-10-CM

## 2021-06-30 NOTE — TELEPHONE ENCOUNTER
RECORDS RECEIVED FROM: closed displaced fracture of third metatarsal bone of R foot/Dr. Khan/XR/[facility]/ortho con   DATE RECEIVED: Aug 3, 2021     NOTES STATUS DETAILS   OFFICE NOTE from referring provider Internal  Jalen Khan MD        OFFICE NOTE from other specialist N/A    DISCHARGE SUMMARY from hospital N/A    DISCHARGE REPORT from the ER N/A    OPERATIVE REPORT N/A    MEDICATION LIST Internal    IMPLANT RECORD/STICKER N/A    LABS     CBC/DIFF N/A    CULTURES N/A    INJECTIONS DONE IN RADIOLOGY N/A    MRI N/A    CT SCAN N/A    XRAYS (IMAGES & REPORTS) Internal    TUMOR     PATHOLOGY  Slides & report N/A      06/30/21   4:34 PM   COMPLETE  Eloina Bowling CMA

## 2021-08-03 ENCOUNTER — PRE VISIT (OUTPATIENT)
Dept: ORTHOPEDICS | Facility: CLINIC | Age: 41
End: 2021-08-03

## 2021-08-03 ENCOUNTER — OFFICE VISIT (OUTPATIENT)
Dept: ORTHOPEDICS | Facility: CLINIC | Age: 41
End: 2021-08-03
Payer: COMMERCIAL

## 2021-08-03 VITALS — WEIGHT: 134.6 LBS | HEIGHT: 68 IN | BODY MASS INDEX: 20.4 KG/M2

## 2021-08-03 DIAGNOSIS — M25.571 PAIN IN JOINT, ANKLE AND FOOT, RIGHT: Primary | ICD-10-CM

## 2021-08-03 DIAGNOSIS — S92.331A CLOSED DISPLACED FRACTURE OF THIRD METATARSAL BONE OF RIGHT FOOT, INITIAL ENCOUNTER: ICD-10-CM

## 2021-08-03 PROCEDURE — 99203 OFFICE O/P NEW LOW 30 MIN: CPT | Performed by: ORTHOPAEDIC SURGERY

## 2021-08-03 ASSESSMENT — MIFFLIN-ST. JEOR: SCORE: 1312.66

## 2021-08-03 NOTE — LETTER
8/3/2021         RE: Kathya Mathews  [address]  [facility] [address]        Dear Colleague,    Thank you for referring your patient, Kathya Mathews, to the [facility] [place]. Please see a copy of my visit note below.    CHIEF COMPLAINT:  Right foot pain, status post bunion corrective surgery by an outside provider performed in 2020.    HISTORY OF PRESENT ILLNESS:  Mrs. Mathews is a 41-year-old female who presents today for evaluation of her right foot.  The patient reports to have undergone a bunion corrective surgery by outside provider and that she reports to have struggled quite a bit with returning to running, which seems to be her passion.  She reports to be a very avid trail runner and has in mind to run some fairly fast triathlons, as well as even to go for an Ironman.    Currently, she reports to have no problems from the foot.  Denies to have any pain, in spite of having the bunion deformity on somewhat of a high-riding second toe, but she is able to maneuver through all this.    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    The patient also carries a history of a third metatarsal fracture, which currently is not a problem to her at all.    PAST MEDICAL HISTORY:  None.    PAST SURGICAL HISTORY:  Reviewed today.    DRUG ALLERGIES:  Reviewed today.    CURRENT MEDICATIONS:  Reviewed today.    PHYSICAL EXAMINATION:  On today's visit, she presents as a pleasant female, in no apparent distress with a height of 5 feet 7 inches and a weight of 134 pounds.  Denies to have any constitutional symptoms.    On today's visit, she presents with full range of motion of the right ankle, hindfoot and midfoot joints.  Presents with a semi-correctable bunion deformity of the great toe on a high-riding second toe.  There are well-healed surgical incisions.  There is some diffuse swelling across the  medial border of the right foot.  There are palpable pulses.    Plain x-rays were reviewed today, which were significant for showing a noncongruent bunion deformity for the right foot with hardware intact and in place.  She presents with a screw across the proximal phalanx of the great toe.  The second MTP joint is well-reduced.    ASSESSMENT:  1.  Status post right foot, recurrent bunion after surgical correction.  2.  Right second hammertoe deformity.    PLAN:  I discussed with the patient that at this point if, in fact, she has no activity limitations, my recommendation will be to proceed with activities as tolerated.  If, in fact, she presents with restrictions or limitations, my recommendation will be to undergo a right first MTP joint fusion with pinning of the second toe and possible second metatarsal shortening osteotomy.    However, I believe that at this point, there is no indication for such intervention given her high level of function.    All questions were answered.  The patient was pleased with the discussion.  She will follow up on a p.r.n. basis.    TT:  30 minutes.  CT:  20 minutes.    Rico King MD

## 2021-08-03 NOTE — PROGRESS NOTES
CHIEF COMPLAINT:  Right foot pain, status post bunion corrective surgery by an outside provider performed in 2020.    HISTORY OF PRESENT ILLNESS:  Mrs. Mathews is a 41-year-old female who presents today for evaluation of her right foot.  The patient reports to have undergone a bunion corrective surgery by outside provider and that she reports to have struggled quite a bit with returning to running, which seems to be her passion.  She reports to be a very avid trail runner and has in mind to run some fairly fast triathlons, as well as even to go for an Ironman.    Currently, she reports to have no problems from the foot.  Denies to have any pain, in spite of having the bunion deformity on somewhat of a high-riding second toe, but she is able to maneuver through all this.    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    The patient also carries a history of a third metatarsal fracture, which currently is not a problem to her at all.    PAST MEDICAL HISTORY:  None.    PAST SURGICAL HISTORY:  Reviewed today.    DRUG ALLERGIES:  Reviewed today.    CURRENT MEDICATIONS:  Reviewed today.    PHYSICAL EXAMINATION:  On today's visit, she presents as a pleasant female, in no apparent distress with a height of 5 feet 7 inches and a weight of 134 pounds.  Denies to have any constitutional symptoms.    On today's visit, she presents with full range of motion of the right ankle, hindfoot and midfoot joints.  Presents with a semi-correctable bunion deformity of the great toe on a high-riding second toe.  There are well-healed surgical incisions.  There is some diffuse swelling across the medial border of the right foot.  There are palpable pulses.    Plain x-rays were reviewed today, which were significant for showing a noncongruent bunion deformity for the right foot with hardware intact and in place.  She presents with a screw across the proximal  phalanx of the great toe.  The second MTP joint is well-reduced.    ASSESSMENT:  1.  Status post right foot, recurrent bunion after surgical correction.  2.  Right second hammertoe deformity.    PLAN:  I discussed with the patient that at this point if, in fact, she has no activity limitations, my recommendation will be to proceed with activities as tolerated.  If, in fact, she presents with restrictions or limitations, my recommendation will be to undergo a right first MTP joint fusion with pinning of the second toe and possible second metatarsal shortening osteotomy.    However, I believe that at this point, there is no indication for such intervention given her high level of function.    All questions were answered.  The patient was pleased with the discussion.  She will follow up on a p.r.n. basis.    TT:  30 minutes.  CT:  20 minutes.

## 2021-08-03 NOTE — NURSING NOTE
"Reason For Visit:   Chief Complaint   Patient presents with     Consult     Closed displaced Fx of third metatarsal bone of Rt foot // DOI 6/5/2021       Ht 1.725 m (5' 7.91\")   Wt 61.1 kg (134 lb 9.6 oz)   BMI 20.52 kg/m      Pain Assessment  Patient Currently in Pain: Yes  0-10 Pain Scale: 0  Primary Pain Location: Foot    Elie Jon, EMT    "

## 2021-09-12 ENCOUNTER — HEALTH MAINTENANCE LETTER (OUTPATIENT)
Age: 41
End: 2021-09-12

## 2021-11-23 ENCOUNTER — LAB REQUISITION (OUTPATIENT)
Dept: LAB | Facility: CLINIC | Age: 41
End: 2021-11-23

## 2021-11-23 ENCOUNTER — APPOINTMENT (OUTPATIENT)
Dept: FAMILY MEDICINE | Facility: CLINIC | Age: 41
End: 2021-11-23
Payer: COMMERCIAL

## 2021-11-23 LAB — SARS-COV-2 RNA RESP QL NAA+PROBE: NEGATIVE

## 2021-11-23 PROCEDURE — U0005 INFEC AGEN DETEC AMPLI PROBE: HCPCS | Performed by: INTERNAL MEDICINE

## 2022-01-02 ENCOUNTER — HEALTH MAINTENANCE LETTER (OUTPATIENT)
Age: 42
End: 2022-01-02

## 2022-03-28 ENCOUNTER — ANCILLARY PROCEDURE (OUTPATIENT)
Dept: GENERAL RADIOLOGY | Facility: CLINIC | Age: 42
End: 2022-03-28
Attending: FAMILY MEDICINE
Payer: COMMERCIAL

## 2022-03-28 DIAGNOSIS — M79.18 RIGHT BUTTOCK PAIN: ICD-10-CM

## 2022-03-28 DIAGNOSIS — M79.18 RIGHT BUTTOCK PAIN: Primary | ICD-10-CM

## 2022-03-28 PROCEDURE — 73502 X-RAY EXAM HIP UNI 2-3 VIEWS: CPT

## 2022-03-29 ENCOUNTER — HOSPITAL ENCOUNTER (OUTPATIENT)
Dept: MRI IMAGING | Facility: CLINIC | Age: 42
Discharge: HOME | End: 2022-03-29
Attending: FAMILY MEDICINE | Admitting: FAMILY MEDICINE
Payer: COMMERCIAL

## 2022-03-29 DIAGNOSIS — M79.18 RIGHT BUTTOCK PAIN: ICD-10-CM

## 2022-03-29 PROCEDURE — 72195 MRI PELVIS W/O DYE: CPT

## 2022-03-29 PROCEDURE — 72195 MRI PELVIS W/O DYE: CPT | Mod: 26 | Performed by: RADIOLOGY

## 2022-03-29 NOTE — PROGRESS NOTES
Patient having right buttock and SI joint pain after several falls while running.  Pelvis x-rays negative.  Plan to order pelvis MRI to further evaluate cause of right buttock pain.

## 2022-03-31 ENCOUNTER — TELEPHONE (OUTPATIENT)
Dept: ORTHOPEDICS | Facility: CLINIC | Age: 42
End: 2022-03-31
Payer: COMMERCIAL

## 2022-03-31 NOTE — CONFIDENTIAL NOTE
Patient contacted via telephone regarding pelvis MRI results.  Given findings plan to treat with continue PT and follow-up as needed for possible steroid injections hamstring vs SI joint.  Patient understands and agrees with plan.     Jalen Khan MD

## 2022-05-11 ENCOUNTER — VIRTUAL VISIT (OUTPATIENT)
Dept: FAMILY MEDICINE | Facility: CLINIC | Age: 42
End: 2022-05-11
Payer: COMMERCIAL

## 2022-05-11 DIAGNOSIS — R53.83 FATIGUE, UNSPECIFIED TYPE: Primary | ICD-10-CM

## 2022-05-11 PROCEDURE — 99214 OFFICE O/P EST MOD 30 MIN: CPT | Mod: 95 | Performed by: FAMILY MEDICINE

## 2022-05-11 RX ORDER — MULTIVITAMIN WITH IRON
250 TABLET ORAL DAILY
COMMUNITY
Start: 2022-04-25 | End: 2023-09-19

## 2022-05-11 NOTE — PROGRESS NOTES
Kathya is a 41 year old who is being evaluated via a billable video visit.      How would you like to obtain your AVS? MyChart  If the video visit is dropped, the invitation should be resent by: Text to cell phone: [phone]  Will anyone else be joining your video visit? No    Video Start Time: 500pm    Assessment & Plan     Fatigue, unspecified type  coivd related   Rule out other etiologies   - TSH with free T4 reflex; Future  - CBC with platelets; Future  - Comprehensive metabolic panel; Future  - Vitamin B12; Future  - Vitamin D Deficiency; Future  - Ferritin; Future                 Return in about 1 month (around 6/11/2022), or if symptoms worsen or fail to improve.    Gisela Le MD  [facility]   Kathya is a 41 year old who presents for the following health issues     History of Present Illness       Reason for visit:  Fatigue, facial rash  Symptom onset:  More than a month  Symptoms include:  Fatigue, rash around eyes  Symptom intensity:  Moderate  Symptom progression:  Staying the same  Had these symptoms before:  No  What makes it worse:  N  What makes it better:  N    She eats 4 or more servings of fruits and vegetables daily.She consumes 0 sweetened beverage(s) daily.She exercises with enough effort to increase her heart rate 60 or more minutes per day.  She exercises with enough effort to increase her heart rate 7 days per week. She is missing 1 dose(s) of medications per week.  She is not taking prescribed medications regularly due to remembering to take.     Rash 2 months  Covid Early February   Mainly rash around eyes  Had a lot of body aches also  Body aches waxes and wains  Bridge of her nose and above eye brows, lateral on cheek  Mainly fatigue is bothersome - affecting life   Avid runner - has been hard to get back into running post Covid  Normal activities seem to be harder than they should be    Chronic abdominal issue  Seems to have gotten  worse  Doesn't seem to correlate with rash and fatigue      Really just has not bounced back from covid like she thought she would     She can do ADLs but going above and beyond is difficult  Not back to her running regimen     She is wanting to rule out other possible etiologies   She is at the 3 month nanci post covid             Review of Systems   Constitutional, HEENT, cardiovascular, pulmonary, gi and gu systems are negative, except as otherwise noted.      Objective           Vitals:  No vitals were obtained today due to virtual visit.    Physical Exam   GENERAL: Healthy, alert and no distress  EYES: Eyes grossly normal to inspection.  No discharge or erythema, or obvious scleral/conjunctival abnormalities.  RESP: No audible wheeze, cough, or visible cyanosis.  No visible retractions or increased work of breathing.    SKIN: Visible skin clear. No significant rash, abnormal pigmentation or lesions.  NEURO: Cranial nerves grossly intact.  Mentation and speech appropriate for age.  PSYCH: Mentation appears normal, affect normal/bright, judgement and insight intact, normal speech and appearance well-groomed.                Video-Visit Details    Type of service:  Video Visit    Video End Time:530pm    Originating Location (pt. Location): Home    Distant Location (provider location):  [facility]     Platform used for Video Visit: EVOFEM

## 2022-05-12 ENCOUNTER — OFFICE VISIT (OUTPATIENT)
Dept: ORTHOPEDICS | Facility: CLINIC | Age: 42
End: 2022-05-12
Payer: COMMERCIAL

## 2022-05-12 ENCOUNTER — LAB (OUTPATIENT)
Dept: LAB | Facility: CLINIC | Age: 42
End: 2022-05-12
Payer: COMMERCIAL

## 2022-05-12 DIAGNOSIS — R26.9 ABNORMALITY OF GAIT: ICD-10-CM

## 2022-05-12 DIAGNOSIS — S76.301D RIGHT HAMSTRING INJURY, SUBSEQUENT ENCOUNTER: Primary | ICD-10-CM

## 2022-05-12 DIAGNOSIS — R53.83 FATIGUE, UNSPECIFIED TYPE: ICD-10-CM

## 2022-05-12 LAB
ALBUMIN SERPL-MCNC: 3.9 G/DL (ref 3.4–5)
ALP SERPL-CCNC: 68 U/L (ref 40–150)
ALT SERPL W P-5'-P-CCNC: 29 U/L (ref 0–50)
ANION GAP SERPL CALCULATED.3IONS-SCNC: 6 MMOL/L (ref 3–14)
AST SERPL W P-5'-P-CCNC: 27 U/L (ref 0–45)
BILIRUB SERPL-MCNC: 0.6 MG/DL (ref 0.2–1.3)
BUN SERPL-MCNC: 34 MG/DL (ref 7–30)
CALCIUM SERPL-MCNC: 9.3 MG/DL (ref 8.5–10.1)
CHLORIDE BLD-SCNC: 106 MMOL/L (ref 94–109)
CO2 SERPL-SCNC: 26 MMOL/L (ref 20–32)
CREAT SERPL-MCNC: 0.83 MG/DL (ref 0.52–1.04)
DEPRECATED CALCIDIOL+CALCIFEROL SERPL-MC: 47 UG/L (ref 20–75)
ERYTHROCYTE [DISTWIDTH] IN BLOOD BY AUTOMATED COUNT: 12.1 % (ref 10–15)
FERRITIN SERPL-MCNC: 34 NG/ML (ref 12–150)
GFR SERPL CREATININE-BSD FRML MDRD: 89 ML/MIN/1.73M2
GLUCOSE BLD-MCNC: 88 MG/DL (ref 70–99)
HCT VFR BLD AUTO: 40 % (ref 35–47)
HGB BLD-MCNC: 13.3 G/DL (ref 11.7–15.7)
MCH RBC QN AUTO: 33.1 PG (ref 26.5–33)
MCHC RBC AUTO-ENTMCNC: 33.3 G/DL (ref 31.5–36.5)
MCV RBC AUTO: 100 FL (ref 78–100)
PLATELET # BLD AUTO: 245 10E3/UL (ref 150–450)
POTASSIUM BLD-SCNC: 4.3 MMOL/L (ref 3.4–5.3)
PROT SERPL-MCNC: 7 G/DL (ref 6.8–8.8)
RBC # BLD AUTO: 4.02 10E6/UL (ref 3.8–5.2)
SODIUM SERPL-SCNC: 138 MMOL/L (ref 133–144)
TSH SERPL DL<=0.005 MIU/L-ACNC: 1.41 MU/L (ref 0.4–4)
VIT B12 SERPL-MCNC: 826 PG/ML (ref 193–986)
WBC # BLD AUTO: 6.2 10E3/UL (ref 4–11)

## 2022-05-12 PROCEDURE — 82728 ASSAY OF FERRITIN: CPT

## 2022-05-12 PROCEDURE — 85027 COMPLETE CBC AUTOMATED: CPT

## 2022-05-12 PROCEDURE — 36415 COLL VENOUS BLD VENIPUNCTURE: CPT

## 2022-05-12 PROCEDURE — 82306 VITAMIN D 25 HYDROXY: CPT

## 2022-05-12 PROCEDURE — 84443 ASSAY THYROID STIM HORMONE: CPT

## 2022-05-12 PROCEDURE — 82607 VITAMIN B-12: CPT

## 2022-05-12 PROCEDURE — 80053 COMPREHEN METABOLIC PANEL: CPT

## 2022-05-12 PROCEDURE — 0232T NJX PLATELET PLASMA: CPT | Mod: RT | Performed by: FAMILY MEDICINE

## 2022-05-12 NOTE — LETTER
2022         RE: Kathya Mathews  [address] [facility] [address]        Dear Colleague,    Thank you for referring your patient, Kathya Mathews, to the [facility] [place]. Please see a copy of my visit note below.    Kathya Mathews  :  1980  DOS: May 12, 2022  MRN: [id number]    Sports Medicine Clinic Procedure      Ultrasound Guided PRP Injection of the Right Hamstring Tendon      Diagnosis: (S76.301D) Right hamstring injury, subsequent encounter  (primary encounter diagnosis)  Comment: Right hamstring tendon PRP injection for partial thickness tear     PRP PROCEDURE:  The patient was prepped and approximately 60 cc of whole blood was withdrawn using a standard sterile technique via antecubiatal access of the arm. The whole blood was processed on location in the [employer] [facility] and approximately 4.5 cc of Platelet Rich Plasma was obtained.      Technique: The risks of the procedure were explained to the patient.  A consent was signed for the right proximal hamstring injection.  The patient was evaluated with a [employer] ultrasound machine using a curvilinear probe.       The right Hip was prepped and draped in a sterile manner.  Ultrasound identification of the right hamstring tendon in both long and short axis.  Area blood vessels noted.  The probe was placed in a oblique-sagittal axis to the right hamstring tendon .  Injection tract was anesthetized with 3 ml of 1% lidocaine without epinephrine.  A 3.5 inch 22 gauge needle was placed under ultrasound guidance into the right hamstring tendon.   A solution with total of volume of 4.5 cc PRP was injected into the right hamstring tendon in both long and short axis taking care to distribute the volume throughout all areas.  There was ultrasound documentation of needle placement and injection.        Impression:  Successful right hamstring tendon PRP injection      Plan:  Follow up prn    Plan follow up appointment in 2 weeks.  Discussed potential next steps based on clinical progress  Advised will likely be sore over next 2-14 days, OTC and supportive care reviewed  All questions were answered today  Contact us with additional questions or concerns  Signs and sx of concern reviewed      Jalen Khan MD  Primary Care Sports Medicine  [facility]         Again, thank you for allowing me to participate in the care of your patient.        Sincerely,        Jalen Khan MD

## 2022-05-12 NOTE — PATIENT INSTRUCTIONS
PRP (Platelet Rich Plasma) Post-procedure Rehab  > 14 days  -rehab visits once/week  -Formal PT   1. Stretching for the affected muscle-tendon unit once / day, 3-4 reps, holding 20-30 seconds.   2. Joint mobilization to restore normal joint mechanics.   3. Strengthening at moderate intensity   4. Balance and proprioception activities   5. Core strengthening   6. Non-impact activities with progressive resistance, duration and intensity   upper body ergometer, elliptical , stationary bike, deep water running.  -Progression criteria   1. Full range of motion   2. No pain with activities of daily living   3. Pain free 5/5 muscle testing   4. Symmetric proprioception of the affected limb   5. No high velocity / intensity exercise, plyometrics or heavy lifting    no sooner than 6-8 weeks after procedure and when they have met progression criteria from previous stage  rehab visits once every 2 weeks  Formal PT   1. Continued strengthening w/ increases in resistance, repetition and/or frequency   2. Sport/work specific balance and proprioceptive drills   3. Continued core strengthening   4. Return to sport programs (throwing, running, etc) w/ symptom / criteria based progressions  Progression criteria   1. Return to sport/work criteria   2. Good dynamic neuromuscular control w/ multi-plane activities w/o pain    PRP (Platelet Rich Plasma) Post-Procedure Instructions  1. Do not take anti-inflammatories (Motrin, Advil, Naprosyn, Naproxen, Aleve, Indocin, Mobic, Toradol, Voltaren, Arthrotec, Ibuprofen, Diclofenac) for 2 weeks after the procedure  2. You can take Tylenol up to 1000 mg / dose and no more than 3,000 mg / day  3. You will be given a prescription strength pain medication (Norco) to be used for severe pain.  No driving or alcohol while taking narcotics.  4. If pain is persistent after Tylenol and Norco you may apply ice to the affected area for 20 minutes. Limit icing within the first 2 weeks.  5. Plan to  rest the remainder of the day after the procedure.  6. A follow-up appointment should be scheduled for 1 week after the procedure.  7. Formal physical therapy, if needed, will begin 1-2 weeks after the procedure.  8. It can take up to 6-8 weeks to determine your full response to the PRP injection    For knee osteoarthritis  1. You will be non weight bearing for 3 days but please do gentle range of motion of the joint.  2. Thereafter walking is allowed but no weight bearing exercise for 2 weeks. Swimming and no resistance recumbent bike is allowed during this 2 week period.  4. Weight bearing exercises can begin after 2 weeks depending on your response to treatment.    For meniscal / labral tearing  1. You will be non weight bearing for 2 weeks but please do gentle range of motion of the joint.  2. Thereafter walking is allowed but no weight bearing exercise for 2-4 weeks depending on extent of tearing. Swimming and no resistance recumbent bike is allowed during this 2-4 week period.  4. Weight bearing exercise will begin after 2-4 weeks depending on your response to treatment.      For hamstring tendinopathy/tearing  1. Recommend toe-touch to partial weight bearing as tolerate over the first 1-2 weeks   2. Thereafter walking is allowed but no explosive / weight bearing exercise for up to 6 weeks.

## 2022-05-12 NOTE — PROGRESS NOTES
Kathya Mathews  :  1980  DOS: May 12, 2022  MRN: [id number]    Sports Medicine Clinic Procedure      Ultrasound Guided PRP Injection of the Right Hamstring Tendon      Diagnosis: (S76.301D) Right hamstring injury, subsequent encounter  (primary encounter diagnosis)  Comment: Right hamstring tendon PRP injection for partial thickness tear     PRP PROCEDURE:  The patient was prepped and approximately 60 cc of whole blood was withdrawn using a standard sterile technique via antecubiatal access of the arm. The whole blood was processed on location in the [facility] and approximately 4.5 cc of Platelet Rich Plasma was obtained.      Technique: The risks of the procedure were explained to the patient.  A consent was signed for the right proximal hamstring injection.  The patient was evaluated with a [employer] ultrasound machine using a curvilinear probe.       The right Hip was prepped and draped in a sterile manner.  Ultrasound identification of the right hamstring tendon in both long and short axis.  Area blood vessels noted.  The probe was placed in a oblique-sagittal axis to the right hamstring tendon .  Injection tract was anesthetized with 3 ml of 1% lidocaine without epinephrine.  A 3.5 inch 22 gauge needle was placed under ultrasound guidance into the right hamstring tendon.   A solution with total of volume of 4.5 cc PRP was injected into the right hamstring tendon in both long and short axis taking care to distribute the volume throughout all areas.  There was ultrasound documentation of needle placement and injection.        Impression:  Successful right hamstring tendon PRP injection      Plan:  Follow up prn   Plan follow up appointment in 2 weeks.  Discussed potential next steps based on clinical progress  Advised will likely be sore over next 2-14 days, OTC and supportive care reviewed  All questions were answered today  Contact us with additional questions or concerns  Signs  and sx of concern reviewed      Jalen Khan MD  Primary Care Sports Medicine  [facility]

## 2022-06-22 DIAGNOSIS — U07.1 INFECTION DUE TO 2019 NOVEL CORONAVIRUS: Primary | ICD-10-CM

## 2022-08-05 ENCOUNTER — ANCILLARY PROCEDURE (OUTPATIENT)
Dept: GENERAL RADIOLOGY | Facility: CLINIC | Age: 42
End: 2022-08-05
Attending: FAMILY MEDICINE
Payer: COMMERCIAL

## 2022-08-05 ENCOUNTER — OFFICE VISIT (OUTPATIENT)
Dept: ORTHOPEDICS | Facility: CLINIC | Age: 42
End: 2022-08-05
Payer: COMMERCIAL

## 2022-08-05 DIAGNOSIS — S93.124A DISLOCATION OF METATARSOPHALANGEAL JOINT OF LESSER TOE OF RIGHT FOOT, INITIAL ENCOUNTER: Primary | ICD-10-CM

## 2022-08-05 DIAGNOSIS — M79.671 RIGHT FOOT PAIN: ICD-10-CM

## 2022-08-05 PROCEDURE — 99213 OFFICE O/P EST LOW 20 MIN: CPT | Performed by: FAMILY MEDICINE

## 2022-08-05 PROCEDURE — 73630 X-RAY EXAM OF FOOT: CPT | Mod: TC | Performed by: RADIOLOGY

## 2022-08-05 NOTE — PATIENT INSTRUCTIONS
# Right 2nd Toe Dislocation: Symptoms noted over the past 4 mon in the setting of running.  There is a palpable clicking at the 1st MTP with associated tenderness to palpation.    Image Findings: dislocated 2nd MTP joint  Treatment: no running, can ERG  Job: as tolerated  Medications/Injections: Limited tylenol/ibuprofen for pain for 1-2 weeks, none  Follow-up: foot and ankle surgery    Please call [phone]   Ask for my team if you have any questions or concerns    If you have not yet received the influenza vaccine but would like to get one, please call  [phone] or you can schedule via [facility]    It was great seeing you again  today!    Jalen Khan MD, CAQSM

## 2022-08-05 NOTE — PROGRESS NOTES
ASSESSMENT & PLAN    Diagnoses and all orders for this visit:    Right foot pain  -     XR Foot Right G/E 3 Views; Future      This issue is acute on chronic and Worsening.    # Right 2nd Toe Dislocation: Symptoms noted over the past 4 mon in the setting of running.  There is a palpable clicking at the 1st MTP with associated tenderness to palpation.    Image Findings: dislocated 2nd MTP joint  Treatment: no running, can ERG  Job: as tolerated  Medications/Injections: Limited tylenol/ibuprofen for pain for 1-2 weeks, none  Follow-up: foot and ankle surgery    Jalen Khan MD  [facility] [place]    -----  No chief complaint on file.      SUBJECTIVE  Kathya Mathews is a/an 42 year old female who is seen for evaluation of 2nd toe pain.     The patient is seen by themselves.       Onset: 3 month(s) ago. Pain progressively worsening with running, patient concerned about possible neuroma  Location of Pain: right 2nd toe  Worsened by: walking/runnign  Better with: rest  Treatments tried: rest, change shoes  Associated symptoms: no distal numbness or tingling; denies swelling or warmth    Orthopedic/Surgical history: NO  Social History/Occupation: pain physician    No family history pertinent to patient's problem today.      REVIEW OF SYSTEMS:  Review of Systems  Constitutional, HEENT, cardiovascular, pulmonary, gi and gu systems are negative, except as otherwise noted.    OBJECTIVE:  There were no vitals taken for this visit.   General: healthy, alert and in no distress  HEENT: no scleral icterus or conjunctival erythema  Skin: no suspicious lesions or rash. No jaundice.  CV: distal perfusion intact    Resp: normal respiratory effort without conversational dyspnea   Psych: normal mood and affect  Gait: normal steady gait with appropriate coordination and balance    Neuro: Normal light sensory exam of right lower extremity      RIGHT FOOT  Inspection:   dorsal 1st MTP scar, no  swelling  Palpation:    Tender about the 2nd MTP joints palpable deformity with clunking     Range of Motion:     Active toe flexion and extension  - intact    Active ankle range of motion - intact    Passive ankle range of motion - intact  Strength:    Intrinsic foot musculature strength - intact    Ankle strength - intact  Special Tests:    Positive: MTP stress    Negative: anterior drawer, calcaneal squeeze and Lisfranc joint tenderness        RADIOLOGY:  I independently  ordered, visualized and reviewed these images with the patient  Dislocated 2nd toe    After receiving consent from patient, the 2nd MTP joint was anesthetized with 2 ml of 1% lidocaine. Traction was applied with dorsal to plantar force with reduction attempts unsuccessful.  Given this procedure was aborted.     Review of external notes as documented elsewhere in note  Review of the result(s) of each unique test - right 2nd toe       Disclaimer: This note consists of symbols derived from keyboarding, dictation and/or voice recognition software. As a result, there may be errors in the script that have gone undetected. Please consider this when interpreting information found in this chart.

## 2022-08-05 NOTE — LETTER
8/5/2022         RE: Kathya Mathews  [address]  [place] [facility] [address]        Dear Colleague,    Thank you for referring your patient, Kathya Mathews, to the [facility]. Please see a copy of my visit note below.    ASSESSMENT & PLAN    Diagnoses and all orders for this visit:    Right foot pain  -     XR Foot Right G/E 3 Views; Future      This issue is acute on chronic and Worsening.    # Right 2nd Toe Dislocation: Symptoms noted over the past 4 mon in the setting of running.  There is a palpable clicking at the 1st MTP with associated tenderness to palpation.    Image Findings: dislocated 2nd MTP joint  Treatment: no running, can ERG  Job: as tolerated  Medications/Injections: Limited tylenol/ibuprofen for pain for 1-2 weeks, none  Follow-up: foot and ankle surgery    Jalen Khan MD  [facility]    -----  No chief complaint on file.      SUBJECTIVE  Kathya Mathews is a/an 42 year old female who is seen for evaluation of 2nd toe pain.     The patient is seen by themselves.       Onset: 3 month(s) ago. Pain progressively worsening with running, patient concerned about possible neuroma  Location of Pain: right 2nd toe  Worsened by: walking/runnign  Better with: rest  Treatments tried: rest, change shoes  Associated symptoms: no distal numbness or tingling; denies swelling or warmth    Orthopedic/Surgical history: NO  Social History/Occupation: pain physician    No family history pertinent to patient's problem today.      REVIEW OF SYSTEMS:  Review of Systems  Constitutional, HEENT, cardiovascular, pulmonary, gi and gu systems are negative, except as otherwise noted.    OBJECTIVE:  There were no vitals taken for this visit.   General: healthy, alert and in no distress  HEENT: no scleral icterus or conjunctival erythema  Skin: no suspicious lesions or rash. No jaundice.  CV: distal perfusion intact    Resp: normal  respiratory effort without conversational dyspnea   Psych: normal mood and affect  Gait: normal steady gait with appropriate coordination and balance    Neuro: Normal light sensory exam of right lower extremity      RIGHT FOOT  Inspection:   dorsal 1st MTP scar, no swelling  Palpation:    Tender about the 2nd MTP joints palpable deformity with clunking     Range of Motion:     Active toe flexion and extension  - intact    Active ankle range of motion - intact    Passive ankle range of motion - intact  Strength:    Intrinsic foot musculature strength - intact    Ankle strength - intact  Special Tests:    Positive: MTP stress    Negative: anterior drawer, calcaneal squeeze and Lisfranc joint tenderness        RADIOLOGY:  I independently  ordered, visualized and reviewed these images with the patient  Dislocated 2nd toe    After receiving consent from patient, the 2nd MTP joint was anesthetized with 2 ml of 1% lidocaine. Traction was applied with dorsal to plantar force with reduction attempts unsuccessful.  Given this procedure was aborted.     Review of external notes as documented elsewhere in note  Review of the result(s) of each unique test - right 2nd toe       Disclaimer: This note consists of symbols derived from keyboarding, dictation and/or voice recognition software. As a result, there may be errors in the script that have gone undetected. Please consider this when interpreting information found in this chart.      Again, thank you for allowing me to participate in the care of your patient.        Sincerely,        Jalen Khan MD

## 2022-08-08 ENCOUNTER — OFFICE VISIT (OUTPATIENT)
Dept: ORTHOPEDICS | Facility: CLINIC | Age: 42
End: 2022-08-08
Payer: COMMERCIAL

## 2022-08-09 ENCOUNTER — OFFICE VISIT (OUTPATIENT)
Dept: ORTHOPEDICS | Facility: CLINIC | Age: 42
End: 2022-08-09
Attending: FAMILY MEDICINE
Payer: COMMERCIAL

## 2022-08-09 VITALS — WEIGHT: 134 LBS | BODY MASS INDEX: 20.43 KG/M2

## 2022-08-09 DIAGNOSIS — M79.671 RIGHT FOOT PAIN: ICD-10-CM

## 2022-08-09 DIAGNOSIS — S93.124A DISLOCATION OF METATARSOPHALANGEAL JOINT OF LESSER TOE OF RIGHT FOOT, INITIAL ENCOUNTER: ICD-10-CM

## 2022-08-09 PROCEDURE — 99214 OFFICE O/P EST MOD 30 MIN: CPT | Performed by: ORTHOPAEDIC SURGERY

## 2022-08-09 NOTE — LETTER
8/9/2022         RE: Kathya Mathews  [address]  [place] [address]        Dear Colleague,    Thank you for referring your patient, Kathya Mathews, to the [facility] [place]. Please see a copy of my visit note below.    CHIEF COMPLAINT:    1.  Right bunion deformity.  2.  Right second MTP joint dislocation.    HISTORY OF PRESENT ILLNESS:  Mrs. Mathews presents today for further followup.  The patient reports to have pain and discomfort all along the plantar aspect of the foot as well as to continue struggling with the foot.    More recently, we believe that approximately 3 weeks ago, she sustained a dislocation of the second MTP joint.    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    PHYSICAL EXAMINATION:  On today's exam, she presents with a dorsally dislocated second MTP joint.  Continues having a semi-correctable bunion deformity.    There are palpable pulses.    IMAGING:  Plain x-rays were reviewed today, which were significant for showing a dislocated second MTP joint with a noncongruent bunion deformity.  There is hardware across the proximal phalanx as well as the distal first metatarsal shaft.    ASSESSMENT:    1.  Right hallux valgus.  2.  Right second MTP joint dislocation.  3.  Right foot metatarsalgia.    PLAN:  Discussed with the patient that from a surgical prospective our recommendation is to undergo a first MTP joint fusion with second metatarsal shortening and pinning of the second toe.  The patient is not in agreement to undergo a fusion.  Therefore, we came to the consensus of having a first metatarsal distal osteotomy with a possible Lalo with possible first tarsometatarsal joint fusion.  I discussed with her the most likely postoperative course and complications from such intervention, which include but are not limited to infection, bleeding, nerve damage, residual pain, residual deformity  and stiffness.    All questions were answered.  The patient will proceed with the surgery sometime in the next month.  In the meantime, she has no activity restrictions.    TT:  20 minutes.  CT:  15 minutes.      Rico King MD

## 2022-08-09 NOTE — NURSING NOTE
Teaching Flowsheet   Relevant Diagnosis: R Bunionectomy  Teaching Topic: R Bunionectomy     RN Note: Pt is calm and cooperative, asking questions appropriately. Pt was instructed on pre-surgical packet requirements including H&P, COVID-19 test, NPO, and pre-surgical scrub. Pt verbalized understanding. Pt will schedule H&P c PCP, COVID test 3-4 days before surgery, scrub and packet given to pt. Pt does have an annual camping trip scheduled soon, so would like surgery afterward. Pt is thinking 9/7/22 at [facility]. Will call if needing to change.     Person(s) involved in teaching:   Patient     Motivation Level:  Asks Questions: Yes  Eager to Learn: Yes  Cooperative: Yes  Receptive (willing/able to accept information): Yes  Any cultural factors/Faith beliefs that may influence understanding or compliance? No     Patient demonstrates understanding of the following:  Reason for the appointment, diagnosis and treatment plan: Yes  Knowledge of proper use of medications and conditions for which they are ordered (with special attention to potential side effects or drug interactions): Yes  Which situations necessitate calling provider and whom to contact: Yes    Proper use and care of (medical equip, care aids, etc.): Yes  Nutritional needs and diet plan: Yes  Pain management techniques: Yes  Wound Care: Yes  How and/when to access community resources: Yes    Harry Wyatt RNCC

## 2022-08-09 NOTE — NURSING NOTE
Reason For Visit:   Chief Complaint   Patient presents with     RECHECK     R foot f/u new pain and dislocation , per pt. 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

## 2022-08-09 NOTE — PROGRESS NOTES
CHIEF COMPLAINT:    1.  Right bunion deformity.  2.  Right second MTP joint dislocation.    HISTORY OF PRESENT ILLNESS:  Mrs. Mathews presents today for further followup.  The patient reports to have pain and discomfort all along the plantar aspect of the foot as well as to continue struggling with the foot.    More recently, we believe that approximately 3 weeks ago, she sustained a dislocation of the second MTP joint.    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    PHYSICAL EXAMINATION:  On today's exam, she presents with a dorsally dislocated second MTP joint.  Continues having a semi-correctable bunion deformity.    There are palpable pulses.    IMAGING:  Plain x-rays were reviewed today, which were significant for showing a dislocated second MTP joint with a noncongruent bunion deformity.  There is hardware across the proximal phalanx as well as the distal first metatarsal shaft.    ASSESSMENT:    1.  Right hallux valgus.  2.  Right second MTP joint dislocation.  3.  Right foot metatarsalgia.    PLAN:  Discussed with the patient that from a surgical prospective our recommendation is to undergo a first MTP joint fusion with second metatarsal shortening and pinning of the second toe.  The patient is not in agreement to undergo a fusion.  Therefore, we came to the consensus of having a first metatarsal distal osteotomy with a possible Lalo with possible first tarsometatarsal joint fusion.  I discussed with her the most likely postoperative course and complications from such intervention, which include but are not limited to infection, bleeding, nerve damage, residual pain, residual deformity and stiffness.    All questions were answered.  The patient will proceed with the surgery sometime in the next month.  In the meantime, she has no activity restrictions.    TT:  20 minutes.  CT:  15 minutes.

## 2022-08-17 ENCOUNTER — OFFICE VISIT (OUTPATIENT)
Dept: FAMILY MEDICINE | Facility: CLINIC | Age: 42
End: 2022-08-17
Payer: COMMERCIAL

## 2022-08-17 VITALS
HEIGHT: 67 IN | WEIGHT: 134.8 LBS | DIASTOLIC BLOOD PRESSURE: 68 MMHG | BODY MASS INDEX: 21.16 KG/M2 | TEMPERATURE: 97.9 F | OXYGEN SATURATION: 99 % | RESPIRATION RATE: 16 BRPM | SYSTOLIC BLOOD PRESSURE: 120 MMHG | HEART RATE: 53 BPM

## 2022-08-17 DIAGNOSIS — Z01.818 PREOP GENERAL PHYSICAL EXAM: Primary | ICD-10-CM

## 2022-08-17 DIAGNOSIS — S93.104S: ICD-10-CM

## 2022-08-17 PROBLEM — Z97.5 IUD (INTRAUTERINE DEVICE) IN PLACE: Status: ACTIVE | Noted: 2020-01-07

## 2022-08-17 PROBLEM — Z15.89 HLA B27 (HLA B27 POSITIVE): Status: ACTIVE | Noted: 2019-01-30

## 2022-08-17 PROBLEM — F33.41 MAJOR DEPRESSIVE DISORDER, RECURRENT EPISODE, IN PARTIAL REMISSION (H): Status: ACTIVE | Noted: 2022-08-17

## 2022-08-17 PROCEDURE — 99214 OFFICE O/P EST MOD 30 MIN: CPT | Performed by: FAMILY MEDICINE

## 2022-08-17 RX ORDER — LANOLIN ALCOHOL/MO/W.PET/CERES
3 CREAM (GRAM) TOPICAL
COMMUNITY

## 2022-08-17 ASSESSMENT — PAIN SCALES - GENERAL: PAINLEVEL: NO PAIN (0)

## 2022-08-17 NOTE — PROGRESS NOTES
[facility]  [address]  [facility] [address]  Phone: [phone]  Primary Provider: No Ref-Primary, Physician  Pre-op Performing Provider: CULLEN SUBRAMANIAN    PREOPERATIVE EVALUATION:  Today's date: 8/17/2022    Kathya Mathews is a 42 year old female who presents for a preoperative evaluation.    Surgical Information:   Surgery/Procedure: RIGHT 1st metatarsal distal osteotomy with possible akin ostetotomy and possible lapidus procedure and RIGHT 2nd metatarsal osteotomy with 2nd toe pinning  Surgery Location: Orthopedic Surgery   Surgeon: Rico King MD  Surgery Date: 9/7/2022  Time of Surgery: TBD  Where patient plans to recover: At home with family  Fax number for surgical facility: [phone]  [facility] fax number: [phone]    Type of Anesthesia Anticipated: Spinal    Assessment & Plan     The proposed surgical procedure is considered INTERMEDIATE risk.    Preop general physical exam  Closed dislocation of second toe of right foot, sequela    Risks and Recommendations:  The patient has the following additional risks and recommendations for perioperative complications:   - No identified additional risk factors other than previously addressed    Medication Instructions:  - HOLD (do not take) NSAIDs for 5 days before surgery - ibuprofen, aspirin, naproxen  - Tylenol is safe to continue if needed for pain  - STOP taking all vitamins and herbal supplements 7 days before surgery.     RECOMMENDATION:  APPROVAL GIVEN to proceed with proposed procedure, without further diagnostic evaluation.    Cullen Subramanian MD  Family Medicine  [facility]            Subjective     HPI related to upcoming procedure: dislocation of 2nd MTP    Preop Questions 8/17/2022   1. Have you ever had a heart attack or stroke? No   2. Have you ever had surgery on your heart or blood vessels, such as a stent placement, a coronary artery bypass, or surgery on an artery  in your head, neck, heart, or legs? No   3. Do you have chest pain with activity? No   4. Do you have a history of  heart failure? No   5. Do you currently have a cold, bronchitis or symptoms of other infection? No   6. Do you have a cough, shortness of breath, or wheezing? No   7. Do you or anyone in your family have previous history of blood clots? No   8. Do you or does anyone in your family have a serious bleeding problem such as prolonged bleeding following surgeries or cuts? No   9. Have you ever had problems with anemia or been told to take iron pills? No   10. Have you had any abnormal blood loss such as black, tarry or bloody stools, or abnormal vaginal bleeding? No   11. Have you ever had a blood transfusion? No   12. Are you willing to have a blood transfusion if it is medically needed before, during, or after your surgery? Yes   13. Have you or any of your relatives ever had problems with anesthesia? No   14. Do you have sleep apnea, excessive snoring or daytime drowsiness? No   15. Do you have any artifical heart valves or other implanted medical devices like a pacemaker, defibrillator, or continuous glucose monitor? No   16. Do you have artificial joints? No   17. Are you allergic to latex? No   18. Is there any chance that you may be pregnant? No     Anesthesia: Partial    Health Care Directive:  Patient does not have a Health Care Directive or Living Will: Discussed advance care planning with patient; however, patient declined at this time.    Preoperative Review of :   reviewed - no record of controlled substances prescribed.    Review of Systems  Constitutional, neuro, ENT, endocrine, pulmonary, cardiac, gastrointestinal, genitourinary, musculoskeletal, integument and psychiatric systems are negative, except as otherwise noted.    Patient Active Problem List    Diagnosis Date Noted     Major depressive disorder, recurrent episode, in partial remission (H) 08/17/2022     Priority: Medium      "Formatting of this note might be different from the original.  Major depressive disorder, recurrent episode, in partial or unspecified remission       IUD (intrauterine device) in place 01/07/2020     Priority: Medium     Formatting of this note might be different from the original.  Mirena IUD placed 1/7/2020  LOT [id number]       HLA B27 (HLA B27 positive) 01/30/2019     Priority: Medium     Eating disorder 06/15/2004     Priority: Medium     Formatting of this note might be different from the original.  Epic        History reviewed. No pertinent past medical history.  History reviewed. No pertinent surgical history.  Current Outpatient Medications   Medication Sig Dispense Refill     cholecalciferol (VITAMIN D) 200 units (5 mcg) TABS half-tab        Ferrous Gluconate 225 (27 Fe) MG TABS        levonorgestrel (MIRENA) 20 MCG/DAY IUD 1 each by Intrauterine route       magnesium 250 MG tablet Take 250 mg by mouth daily       melatonin 3 MG tablet Take 3 mg by mouth       Multiple Vitamin (MULTIVITAMIN ADULT PO) Greens       Allergies   Allergen Reactions     Phenothiazines      Distonic - facial dystonia      Social History     Tobacco Use     Smoking status: Never Smoker     Smokeless tobacco: Never Used   Substance Use Topics     Alcohol use: Yes     Comment: rare     History   Drug Use Unknown         Objective     /68 (BP Location: Right arm, Patient Position: Sitting, Cuff Size: Adult Regular)   Pulse 53   Temp 97.9  F (36.6  C) (Tympanic)   Resp 16   Ht 1.71 m (5' 7.32\")   Wt 61.1 kg (134 lb 12.8 oz)   SpO2 99%   Breastfeeding No   BMI 20.91 kg/m      Physical Exam    GENERAL APPEARANCE: healthy, alert and no distress     EYES: EOMI, PERRL     HENT: ear canals and TM's normal and nose and mouth without ulcers or lesions     NECK: no adenopathy, no asymmetry, masses, or scars and thyroid normal to palpation     RESP: lungs clear to auscultation - no rales, rhonchi or wheezes     CV: regular rates and " rhythm, normal S1 S2, no S3 or S4 and no murmur, click or rub     ABDOMEN:  soft, nontender, no HSM or masses and bowel sounds normal     MS: extremities normal- no gross deformities noted, no evidence of inflammation in joints, FROM in all extremities.     SKIN: no suspicious lesions or rashes     NEURO: Normal strength and tone, sensory exam grossly normal, mentation intact and speech normal     PSYCH: mentation appears normal. and affect normal/bright     LYMPHATICS: No cervical adenopathy    Recent Labs   Lab Test 05/12/22  1241   HGB 13.3         POTASSIUM 4.3   CR 0.83        Diagnostics:  No labs were ordered during this visit.   No EKG required, no history of coronary heart disease, significant arrhythmia, peripheral arterial disease or other structural heart disease.    Revised Cardiac Risk Index (RCRI):  The patient has the following serious cardiovascular risks for perioperative complications:   - No serious cardiac risks = 0 points     RCRI Interpretation: 0 points: Class I (very low risk - 0.4% complication rate)           Signed Electronically by: Priscila Subramanian MD  Copy of this evaluation report is provided to requesting physician.

## 2022-08-17 NOTE — PATIENT INSTRUCTIONS
Preparing for Your Surgery  Getting started  A nurse will call you to review your health history and instructions. They will give you an arrival time based on your scheduled surgery time. Please be ready to share:  Your doctor's clinic name and phone number  Your medical, surgical and anesthesia history  A list of allergies and sensitivities  A list of medicines, including herbal treatments and over-the-counter drugs  Whether the patient has a legal guardian (ask how to send us the papers in advance)  Please tell us if you're pregnant--or if there's any chance you might be pregnant. Some surgeries may injure a fetus (unborn baby), so they require a pregnancy test. Surgeries that are safe for a fetus don't always need a test, and you can choose whether to have one.   If you have a child who's having surgery, please ask for a copy of Preparing for Your Child's Surgery.    Preparing for surgery  Within 30 days of surgery: Have a pre-op exam (sometimes called an H&P, or History and Physical). This can be done at a clinic or pre-operative center.  If you're having a , you may not need this exam. Talk to your care team.  At your pre-op exam, talk to your care team about all medicines you take. If you need to stop any medicines before surgery, ask when to start taking them again.  We do this for your safety. Many medicines can make you bleed too much during surgery. Some change how well surgery (anesthesia) drugs work.  Call your insurance company to let them know you're having surgery. (If you don't have insurance, call [phone].)  Call your clinic if there's any change in your health. This includes signs of a cold or flu (sore throat, runny nose, cough, rash, fever). It also includes a scrape or scratch near the surgery site.  If you have questions on the day of surgery, call your hospital or surgery center.  COVID testing  You may need to be tested for COVID-19 before having surgery. If so, we will give  you instructions.  Eating and drinking guidelines  For your safety: Unless your surgeon tells you otherwise, follow the guidelines below.  Eat and drink as usual until 8 hours before surgery. After that, no food or milk.  Drink clear liquids until 2 hours before surgery. These are liquids you can see through, like water, Gatorade and Propel Water. You may also have black coffee and tea (no cream or milk).  Nothing by mouth within 2 hours of surgery. This includes gum, candy and breath mints.  If you drink alcohol: Stop drinking it the night before surgery.  If your care team tells you to take medicine on the morning of surgery, it's okay to take it with a sip of water.  Preventing infection  Shower or bathe the night before and morning of your surgery. Follow the instructions your clinic gave you. (If no instructions, use regular soap.)  Don't shave or clip hair near your surgery site. We'll remove the hair if needed.  Don't smoke or vape the morning of surgery. You may chew nicotine gum up to 2 hours before surgery. A nicotine patch is okay.  Note: Some surgeries require you to completely quit smoking and nicotine. Check with your surgeon.  Your care team will make every effort to keep you safe from infection. We will:  Clean our hands often with soap and water (or an alcohol-based hand rub).  Clean the skin at your surgery site with a special soap that kills germs.  Give you a special gown to keep you warm. (Cold raises the risk of infection.)  Wear special hair covers, masks, gowns and gloves during surgery.  Give antibiotic medicine, if prescribed. Not all surgeries need antibiotics.  What to bring on the day of surgery  Photo ID and insurance card  Copy of your health care directive, if you have one  Glasses and hearing aides (bring cases)  You can't wear contacts during surgery  Inhaler and eye drops, if you use them (tell us about these when you arrive)  CPAP machine or breathing device, if you use them  A  few personal items, if spending the night  If you have . . .  A pacemaker, ICD (cardiac defibrillator) or other implant: Bring the ID card.  An implanted stimulator: Bring the remote control.  A legal guardian: Bring a copy of the certified (court-stamped) guardianship papers.  Please remove any jewelry, including body piercings. Leave jewelry and other valuables at home.  If you're going home the day of surgery  You must have a responsible adult drive you home. They should stay with you overnight as well.  If you don't have someone to stay with you, and you aren't safe to go home alone, we may keep you overnight. Insurance often won't pay for this.  After surgery  If it's hard to control your pain or you need more pain medicine, please call your surgeon's office.  Questions?   If you have any questions for your care team, list them here: _____  For informational purposes only. Not to replace the advice of your health care provider. Copyright   2003, 2019 [facility]. All rights reserved. Clinically reviewed by Liliane Ingram MD. [employer] [id number] - REV 07/21.    How to Take Your Medication Before Surgery  - HOLD (do not take) NSAIDs for 5 days before surgery - ibuprofen, aspirin, naproxen  - Tylenol is safe to continue if needed for pain  - STOP taking all vitamins and herbal supplements 7 days before surgery.

## 2022-09-12 DIAGNOSIS — Z47.89 ORTHOPEDIC AFTERCARE: Primary | ICD-10-CM

## 2022-09-23 ENCOUNTER — OFFICE VISIT (OUTPATIENT)
Dept: ORTHOPEDICS | Facility: CLINIC | Age: 42
End: 2022-09-23
Payer: COMMERCIAL

## 2022-09-23 DIAGNOSIS — Z47.89 ORTHOPEDIC AFTERCARE: Primary | ICD-10-CM

## 2022-09-23 PROCEDURE — 99207 PR NO CHARGE NURSE ONLY: CPT

## 2022-09-23 NOTE — PROGRESS NOTES
Reason for visit:    Kathya Mathews came in to the clinic for a two week post op check.    Her surgery was done 9/7/2022 by Dr King.  She had a Right foot hardware removal, first metatarsal osteotomy, Akin osteotomy, second and third MT shortening osteotomies, second toe pinning, complete release of second and third MTP joint including collateral ligaments and volar plate.     Assessment:    Kathya came into the clinic in a short CAM walker Partial WB using crutches.    The Surgical wounds were exposed and found to be well-healed; so the sutures were removed. Skin was c/d/i. Minimal swelling noted. Kathya is doing very well with little to no pain.    Plan:     She was placed back into her short CAM walker.  She was told to only be minimally WBAT. Short distances such as around the house, but NWB for longer distances. Minimal walking will reduce the risk of the pin backing out. She may remove the boot for hygiene purposes only.    I will call her in 2 weeks to ask how the pin site is looking and make sure she isn't experiencing any signs of infection. If she is, at any point, she should contact the clinic right away.     She has an appointment to see Dr. King at 6 weeks post op and at that time Dr. King will determine further restrictions.    She has our phone number and will call with questions or problems.    Eloina Lobato ATC

## 2022-10-07 DIAGNOSIS — Z47.89 ORTHOPEDIC AFTERCARE: Primary | ICD-10-CM

## 2022-10-18 ENCOUNTER — ANCILLARY PROCEDURE (OUTPATIENT)
Dept: GENERAL RADIOLOGY | Facility: CLINIC | Age: 42
End: 2022-10-18
Attending: ORTHOPAEDIC SURGERY
Payer: COMMERCIAL

## 2022-10-18 ENCOUNTER — OFFICE VISIT (OUTPATIENT)
Dept: ORTHOPEDICS | Facility: CLINIC | Age: 42
End: 2022-10-18
Payer: COMMERCIAL

## 2022-10-18 DIAGNOSIS — Z47.89 ORTHOPEDIC AFTERCARE: Primary | ICD-10-CM

## 2022-10-18 DIAGNOSIS — M25.571 PAIN IN JOINT, ANKLE AND FOOT, RIGHT: ICD-10-CM

## 2022-10-18 DIAGNOSIS — Z47.89 ORTHOPEDIC AFTERCARE: ICD-10-CM

## 2022-10-18 DIAGNOSIS — F33.41 MAJOR DEPRESSIVE DISORDER, RECURRENT EPISODE, IN PARTIAL REMISSION (H): ICD-10-CM

## 2022-10-18 PROCEDURE — 99024 POSTOP FOLLOW-UP VISIT: CPT | Performed by: ORTHOPAEDIC SURGERY

## 2022-10-18 PROCEDURE — 73630 X-RAY EXAM OF FOOT: CPT | Mod: RT | Performed by: RADIOLOGY

## 2022-10-18 NOTE — PROGRESS NOTES
CHIEF COMPLAINT:  Status post first, second and third metatarsal osteotomy with Lalo osteotomy of the great toe performed on 09/07/2022.    HISTORY OF PRESENT ILLNESS:  Mrs. Mathews presents today for further followup.  Reports to be doing well.  Reports to be compliant.    PHYSICAL EXAMINATION:  On today's visit, she presents with excellent alignment of the foot.  She presents with a clean pin site for the 2nd toe.  All surgical sites are well healed.  There is some diffuse swelling.  Range of motion of the ankle is within normal limits.    IMAGING:  Three views of the right foot were obtained today, which were significant for showing excellent consolidation of the osteotomy sites.  Hardware is intact and in place.  Alignment is excellent.    ASSESSMENT:  Status post right foot corrective surgery for metatarsalgia and bunion deformity.    PLAN:  Discussed with the patient that we are ready to proceed with the removal of the pin in the second toe.  After obtaining verbal consent from the patient, the pin was removed without any complications.    The patient will pursue weightbearing as tolerated with use of the CAM Walker. The CAM Walker will be utilized for outdoors walking, mainly.  She will proceed with physical therapy without restrictions.    The patient was clear also to proceed with a light workout program in order to avoid losing anymore overall strength.  I warned the patient about the importance of not pushing through the pain as she may create some damage to the osteotomies.    All questions were answered.  The patient was pleased with the discussion.  She will follow up in a month from now, and at that time, 3 views of the right foot will be obtained.

## 2022-10-18 NOTE — LETTER
10/18/2022         RE: Kathya Mathews  [address] [address]        Dear Colleague,    Thank you for referring your patient, Kathya Mathews, to the [facility] [place]. Please see a copy of my visit note below.    CHIEF COMPLAINT:  Status post first, second and third metatarsal osteotomy with Lalo osteotomy of the great toe performed on 09/07/2022.    HISTORY OF PRESENT ILLNESS:  Mrs. Mathews presents today for further followup.  Reports to be doing well.  Reports to be compliant.    PHYSICAL EXAMINATION:  On today's visit, she presents with excellent alignment of the foot.  She presents with a clean pin site for the 2nd toe.  All surgical sites are well healed.  There is some diffuse swelling.  Range of motion of the ankle is within normal limits.    IMAGING:  Three views of the right foot were obtained today, which were significant for showing excellent consolidation of the osteotomy sites.  Hardware is intact and in place.  Alignment is excellent.    ASSESSMENT:  Status post right foot corrective surgery for metatarsalgia and bunion deformity.    PLAN:  Discussed with the patient that we are ready to proceed with the removal of the pin in the second toe.  After obtaining verbal consent from the patient, the pin was removed without any complications.    The patient will pursue weightbearing as tolerated with use of the CAM Walker. The CAM Walker will be utilized for outdoors walking, mainly.  She will proceed with physical therapy without restrictions.    The patient was clear also to proceed with a light workout program in order to avoid losing anymore overall strength.  I warned the patient about the importance of not pushing through the pain as she may create some damage to the osteotomies.    All questions were answered.  The patient was pleased with the discussion.  She will follow up in a month from now, and at that time, 3 views of the right foot  will be obtained.      Rico King MD

## 2022-10-18 NOTE — NURSING NOTE
Reason For Visit:   Chief Complaint   Patient presents with     RECHECK     6 week post op bunionectomy // DOS: 9/7/22 // XR today. Pt has had some pain around the site of the pin that has subsided. Overall has been doing well       There were no vitals taken for this visit.         Luis Bartlett, EMT     additional location

## 2022-10-26 ENCOUNTER — THERAPY VISIT (OUTPATIENT)
Dept: PHYSICAL THERAPY | Facility: CLINIC | Age: 42
End: 2022-10-26
Attending: ORTHOPAEDIC SURGERY
Payer: COMMERCIAL

## 2022-10-26 DIAGNOSIS — Z47.89 AFTERCARE FOLLOWING SURGERY OF THE MUSCULOSKELETAL SYSTEM: ICD-10-CM

## 2022-10-26 DIAGNOSIS — Z47.89 ORTHOPEDIC AFTERCARE: ICD-10-CM

## 2022-10-26 PROCEDURE — 97161 PT EVAL LOW COMPLEX 20 MIN: CPT | Mod: GP | Performed by: PHYSICAL THERAPIST

## 2022-10-26 PROCEDURE — 97110 THERAPEUTIC EXERCISES: CPT | Mod: GP | Performed by: PHYSICAL THERAPIST

## 2022-10-26 NOTE — PROGRESS NOTES
Physical Therapy Initial Evaluation  Subjective:  The history is provided by the patient. The history is limited by the absence of a caregiver. No  was used.   Patient Health History  Kathya Mathews being seen for R foot post op 2nd and 3rd metatarsal and great toe osteotomy, wants to return to running.     Problem began: 9/7/2022.   Problem occurred: Bunion started when running competitively in college, first surgery in 2019 for bunion, helped for 6 mo-1 year but pain returned, second surgery 9/7   Pain is reported as 0/10 on pain scale.  General health as reported by patient is excellent.  Pertinent medical history includes: none.   Red flags:  None as reported by patient.  Medical allergies: other. Other medical allergies details: Compazine.   Surgeries include:  Orthopedic surgery. Other surgery history details: R fibula ORIF.    Current medications:  None.    Current occupation is Physician.   Primary job tasks include:  Computer work.                  Therapist Generated HPI Evaluation         Type of problem:  Right foot.    This is a recurrent condition.  Condition occurred with:  Running and insidious onset.  Where condition occurred: during recreation/sport.               Previous treatment includes surgery. There was mild improvement following previous treatment.  Restrictions due to condition include:  Working in normal job without restrictions (working with knee scooter).  Barriers include:  None as reported by patient.                        Objective:    Gait:    Weight Bearing Status:  WBAT   Assistive Devices:  CAM  Deviations:  Ankle:  Push off decr R          Ankle/Foot Evaluation  ROM:      PROM:    Dorsiflexion:  Left:    WNL     Right:   4*   Plantarflexion:  Left:    WNL     Right:  39*  Inversion:  Left:      WNL     Right:   WNL  Eversion: Left:   WNL     Right:  WNL  Great Toe Flexion:  Left:  40*     Right:  12*    Great Toe Extension:  Left:    80*     Right:  50*      Strength:    Dorsiflexion:  Left: 5/5      Pain:-   Right: 5/5   Strong/pain free  Pain:  Plantarflexion: Left: 5/5    Pain:-   Right: 2/5   Pain:-  Inversion:Left: 5/5   Pain:-     Right: 5/5   Pain:-  Eversion:Left: 5/5   Pain:-  Right: 5/5   Pain:-  Flexion Great Toe:Left: 5/5   Pain:-  Right: 4+/5    Pain:-  Extension Great Toe:Left: 4+/5   Pain:-  Right: 3+/5   Pain:-                  PALPATION: Palpation of ankle: Pain on palpation of scar between first and second ray with redness and swelling noted over proximal aspect.    EDEMA: Edema ankle: Pitting edema throughout R foot.                                                            Assessment/Plan:    Patient is a 42 year old female with R foot complaints.    Patient has the following significant findings with corresponding treatment plan.                Diagnosis 1:  Post op R foot osteotomy  Decreased ROM/flexibility - manual therapy and therapeutic exercise  Decreased joint mobility - manual therapy and therapeutic exercise  Decreased strength - therapeutic exercise and therapeutic activities  Edema - self management/home program  Impaired gait - gait training and home program    Therapy Evaluation Codes:   1) History comprised of:   Personal factors that impact the plan of care:      None.    Comorbidity factors that impact the plan of care are:      None.     Medications impacting care: None.  2) Examination of Body Systems comprised of:   Body structures and functions that impact the plan of care:      Ankle and Toes.   Activity limitations that impact the plan of care are:      Jumping and Running.  3) Clinical presentation characteristics are:   Stable/Uncomplicated.  4) Decision-Making    Low complexity using standardized patient assessment instrument and/or measureable assessment of functional outcome.  Cumulative Therapy Evaluation is: Low complexity.    Previous and current functional limitations:  (See Goal Flow Sheet for this  information)    Short term and Long term goals: (See Goal Flow Sheet for this information)     Communication ability:  Patient appears to be able to clearly communicate and understand verbal and written communication and follow directions correctly.  Treatment Explanation - The following has been discussed with the patient:   RX ordered/plan of care  Anticipated outcomes  Possible risks and side effects  This patient would benefit from PT intervention to resume normal activities.   Rehab potential is excellent.    Frequency:  1 X week, once daily  Duration:  for 4 weeks  Discharge Plan:  Achieve all LTG.  Independent in home treatment program.  Return to previous functional level by discharge.  Reach maximal therapeutic benefit.    Please refer to the daily flowsheet for treatment today, total treatment time and time spent performing 1:1 timed codes.

## 2022-11-03 ENCOUNTER — THERAPY VISIT (OUTPATIENT)
Dept: PHYSICAL THERAPY | Facility: CLINIC | Age: 42
End: 2022-11-03
Payer: COMMERCIAL

## 2022-11-03 DIAGNOSIS — Z47.89 AFTERCARE FOLLOWING SURGERY OF THE MUSCULOSKELETAL SYSTEM: Primary | ICD-10-CM

## 2022-11-03 PROCEDURE — 97140 MANUAL THERAPY 1/> REGIONS: CPT | Mod: GP | Performed by: PHYSICAL THERAPIST

## 2022-11-03 PROCEDURE — 97110 THERAPEUTIC EXERCISES: CPT | Mod: GP | Performed by: PHYSICAL THERAPIST

## 2022-11-11 ENCOUNTER — THERAPY VISIT (OUTPATIENT)
Dept: PHYSICAL THERAPY | Facility: CLINIC | Age: 42
End: 2022-11-11
Payer: COMMERCIAL

## 2022-11-11 DIAGNOSIS — Z47.89 AFTERCARE FOLLOWING SURGERY OF THE MUSCULOSKELETAL SYSTEM: Primary | ICD-10-CM

## 2022-11-11 PROCEDURE — 97110 THERAPEUTIC EXERCISES: CPT | Mod: GP | Performed by: PHYSICAL THERAPIST

## 2022-11-15 ENCOUNTER — OFFICE VISIT (OUTPATIENT)
Dept: ORTHOPEDICS | Facility: CLINIC | Age: 42
End: 2022-11-15
Payer: COMMERCIAL

## 2022-11-15 ENCOUNTER — ANCILLARY PROCEDURE (OUTPATIENT)
Dept: GENERAL RADIOLOGY | Facility: CLINIC | Age: 42
End: 2022-11-15
Attending: ORTHOPAEDIC SURGERY
Payer: COMMERCIAL

## 2022-11-15 DIAGNOSIS — Z47.89 ORTHOPEDIC AFTERCARE: ICD-10-CM

## 2022-11-15 DIAGNOSIS — Z47.89 ORTHOPEDIC AFTERCARE: Primary | ICD-10-CM

## 2022-11-15 DIAGNOSIS — M25.571 PAIN IN JOINT, ANKLE AND FOOT, RIGHT: ICD-10-CM

## 2022-11-15 PROCEDURE — 73630 X-RAY EXAM OF FOOT: CPT | Mod: RT | Performed by: RADIOLOGY

## 2022-11-15 PROCEDURE — 99024 POSTOP FOLLOW-UP VISIT: CPT | Performed by: ORTHOPAEDIC SURGERY

## 2022-11-15 NOTE — NURSING NOTE
Reason For Visit:   Chief Complaint   Patient presents with     RECHECK     Rt 1, 2, 3 MT osteotomy with Lalo osteotomy of the great toe DOS 9/7/22       There were no vitals taken for this visit.         Eloina Lobato, ATC

## 2022-11-15 NOTE — LETTER
11/15/2022         RE: Kathya Mathews  [address]  [place] [address]        Dear Colleague,    Thank you for referring your patient, Kathya Mathews, to the [facility] [place]. Please see a copy of my visit note below.    CHIEF COMPLAINT:  Status post right first, second and third metatarsal osteotomy with pinning of the second toe and Akin osteotomy performed 09/07/2022.    HISTORY OF PRESENT ILLNESS:  Ms. Mathews presents today for further followup.  Reports to be doing well.  Denies to have any pain.    PHYSICAL EXAMINATION:  On today's visit, she presents with 1 to 2+ pitting edema through the forefoot region.  There is excellent alignment of the lesser toes as well as the great toe.  There are no signs of infection or inflammation.    IMAGING:  Three views of the right foot were reviewed today, which were significant for showing excellent consolidation of the osteotomy sites.  Hardware is intact and in place.  Alignment is excellent.    ASSESSMENT:  Status post right foot first through third metatarsal osteotomy with pinning of the toe and Akin osteotomy.    PLAN:  Discussed with the patient to proceed with activities as tolerated.  She has no restrictions.  She will eventually perform physical therapy and proceed with the use of the CAM Walker for comfort purposes.    The patient will follow up accordingly.  All questions were answered.    TT:  20 minutes.  DT:  15 minutes.        Rico King MD

## 2022-11-15 NOTE — PROGRESS NOTES
CHIEF COMPLAINT:  Status post right first, second and third metatarsal osteotomy with pinning of the second toe and Akin osteotomy performed 09/07/2022.    HISTORY OF PRESENT ILLNESS:  Ms. Mathews presents today for further followup.  Reports to be doing well.  Denies to have any pain.    PHYSICAL EXAMINATION:  On today's visit, she presents with 1 to 2+ pitting edema through the forefoot region.  There is excellent alignment of the lesser toes as well as the great toe.  There are no signs of infection or inflammation.    IMAGING:  Three views of the right foot were reviewed today, which were significant for showing excellent consolidation of the osteotomy sites.  Hardware is intact and in place.  Alignment is excellent.    ASSESSMENT:  Status post right foot first through third metatarsal osteotomy with pinning of the toe and Akin osteotomy.    PLAN:  Discussed with the patient to proceed with activities as tolerated.  She has no restrictions.  She will eventually perform physical therapy and proceed with the use of the CAM Walker for comfort purposes.    The patient will follow up accordingly.  All questions were answered.    TT:  20 minutes.  DT:  15 minutes.

## 2022-11-19 ENCOUNTER — HEALTH MAINTENANCE LETTER (OUTPATIENT)
Age: 42
End: 2022-11-19

## 2022-11-29 ENCOUNTER — VIRTUAL VISIT (OUTPATIENT)
Dept: FAMILY MEDICINE | Facility: CLINIC | Age: 42
End: 2022-11-29
Payer: COMMERCIAL

## 2022-11-29 DIAGNOSIS — F33.8 SEASONAL AFFECTIVE DISORDER (H): Primary | ICD-10-CM

## 2022-11-29 DIAGNOSIS — F33.8 SEASONAL AFFECTIVE DISORDER (H): ICD-10-CM

## 2022-11-29 PROCEDURE — 99213 OFFICE O/P EST LOW 20 MIN: CPT | Mod: 95 | Performed by: NURSE PRACTITIONER

## 2022-11-29 RX ORDER — BUPROPION HYDROCHLORIDE 150 MG/1
TABLET ORAL
Qty: 55 TABLET | Refills: 0 | Status: SHIPPED | OUTPATIENT
Start: 2022-11-29 | End: 2022-11-30

## 2022-11-29 ASSESSMENT — PATIENT HEALTH QUESTIONNAIRE - PHQ9
SUM OF ALL RESPONSES TO PHQ QUESTIONS 1-9: 10
10. IF YOU CHECKED OFF ANY PROBLEMS, HOW DIFFICULT HAVE THESE PROBLEMS MADE IT FOR YOU TO DO YOUR WORK, TAKE CARE OF THINGS AT HOME, OR GET ALONG WITH OTHER PEOPLE: SOMEWHAT DIFFICULT
SUM OF ALL RESPONSES TO PHQ QUESTIONS 1-9: 10

## 2022-11-29 NOTE — PATIENT INSTRUCTIONS
Seasonal affective disorder (H)  Chronic, intermittent seasonal fatigue, sluggishness.  Previously on bupropion, tolerated well.  Currently taking a vitamin D supplement.  We will restart bupropion, tapering up after 1 week if tolerating.  Also discussed starting a SAD light, order placed for patient to be able to bring to medical supply store.  Would also encourage daily physical activity and healthy, well-balanced diet.  Follow-up via virtual visit in approximately 1 month for recheck.  - buPROPion (WELLBUTRIN XL) 150 MG 24 hr tablet; Take 1 tablet (150 mg) by mouth every morning for 7 days, THEN 2 tablets (300 mg) every morning for 24 days.  - SAD Light, 10,000 Lux Order

## 2022-11-29 NOTE — PROGRESS NOTES
Kathya is a 42 year old who is being evaluated via a billable video visit.      How would you like to obtain your AVS? MyChart  If the video visit is dropped, the invitation should be resent by: Text to cell phone: [phone]  Will anyone else be joining your video visit? No          Assessment & Plan     Seasonal affective disorder (H)  Chronic, intermittent seasonal fatigue, sluggishness.  Previously on bupropion, tolerated well.  Currently taking a vitamin D supplement.  We will restart bupropion, tapering up after 1 week if tolerating.  Also discussed starting a SAD light, order placed for patient to be able to bring to [employer] supply store.  Would also encourage daily physical activity and healthy, well-balanced diet.  Follow-up via virtual visit in approximately 1 month for recheck.  - buPROPion (WELLBUTRIN XL) 150 MG 24 hr tablet; Take 1 tablet (150 mg) by mouth every morning for 7 days, THEN 2 tablets (300 mg) every morning for 24 days.  - SAD Light, 10,000 Lux Order             See Patient Instructions    Return in about 1 month (around 12/29/2022) for Recheck.    Lalitha Baker, DNP, APRN-CNP   [facility]    Subjective   Kathya is a 42 year old, presenting for the following health issues:  Depression      HPI     Depression Followup    How are you doing with your depression since your last visit? Worsened has seasonal effective disorder    Are you having other symptoms that might be associated with depression? Yes:  more tearful and fatigue, increased sleeping and increased appetite    Have you had a significant life event? Just started a new job but its a good job     Are you feeling anxious or having panic attacks?   No    Do you have any concerns with your use of alcohol or other drugs? No    Social History     Tobacco Use     Smoking status: Never     Smokeless tobacco: Never   Vaping Use     Vaping Use: Never used   Substance Use Topics     Alcohol use: Yes      Comment: rare     Drug use: Never     PHQ 11/27/2022 11/29/2022   PHQ-9 Total Score 11 10   Q9: Thoughts of better off dead/self-harm past 2 weeks Not at all Not at all     No flowsheet data found.  Last PHQ-9 11/29/2022   1.  Little interest or pleasure in doing things 2   2.  Feeling down, depressed, or hopeless 1   3.  Trouble falling or staying asleep, or sleeping too much 1   4.  Feeling tired or having little energy 2   5.  Poor appetite or overeating 2   6.  Feeling bad about yourself 1   7.  Trouble concentrating 1   8.  Moving slowly or restless 0   Q9: Thoughts of better off dead/self-harm past 2 weeks 0   PHQ-9 Total Score 10     No flowsheet data found.    Suicide Assessment Five-step Evaluation and Treatment (SAFE-T)      How many servings of fruits and vegetables do you eat daily?  2-3    On average, how many sweetened beverages do you drink each day (Examples: soda, juice, sweet tea, etc.  Do NOT count diet or artificially sweetened beverages)?   0    How many days per week do you exercise enough to make your heart beat faster? 7    How many minutes a day do you exercise enough to make your heart beat faster? 60 or more    How many days per week do you miss taking your medication? N.A.    Patient has taken Bupropion in the past and would like to restart it        Review of Systems   Constitutional, HEENT, cardiovascular, pulmonary, gi and gu systems are negative, except as otherwise noted.      Objective           Vitals:  No vitals were obtained today due to virtual visit.    Physical Exam   GENERAL: Healthy, alert and no distress  EYES: Eyes grossly normal to inspection.  No discharge or erythema, or obvious scleral/conjunctival abnormalities.  RESP: No audible wheeze, cough, or visible cyanosis.  No visible retractions or increased work of breathing.    SKIN: Visible skin clear. No significant rash, abnormal pigmentation or lesions.  NEURO: Cranial nerves grossly intact.  Mentation and speech  appropriate for age.  PSYCH: Mentation appears normal, affect normal/bright, judgement and insight intact, normal speech and appearance well-groomed.    Diagnostic Test Results:  none              Video-Visit Details    Video Start Time: 5:20 PM    Type of service:  Video Visit    Video End Time:5:34 PM    Originating Location (pt. Location): Home        Distant Location (provider location):  Off-site    Platform used for Video Visit: [employer]    Chart documentation with Dragon Voice recognition Software. Although reviewed after completion, some words and grammatical errors may remain.   DME (Durable Medical Equipment) Orders and Documentation  Orders Placed This Encounter   Procedures     SAD Light, 10,000 Lux Order      The patient was assessed and it was determined the patient is in need of the following listed DME Supplies/Equipment. Please complete supporting documentation below to demonstrate medical necessity.      DME All Other Item(s) Documentation    List reason for need and supporting documentation for medical necessity below for each DME item.     1. Seasonal affective disorder

## 2022-11-30 RX ORDER — BUPROPION HCL 300 MG
TABLET, EXTENDED RELEASE 24 HR ORAL
Qty: 30 TABLET | Refills: 0 | Status: SHIPPED | OUTPATIENT
Start: 2022-11-30 | End: 2022-12-16

## 2022-12-07 ENCOUNTER — TELEPHONE (OUTPATIENT)
Dept: FAMILY MEDICINE | Facility: CLINIC | Age: 42
End: 2022-12-07

## 2022-12-07 NOTE — TELEPHONE ENCOUNTER
Patient Quality Outreach    Patient is due for the following:   Cervical Cancer Screening - PAP Needed  Physical Preventive Adult Physical      Topic Date Due     COVID-19 Vaccine (4 - Booster for Pfizer series) 11/25/2021     Flu Vaccine (1) 09/01/2022       Next Steps:   Schedule a Adult Preventative    Type of outreach:    Sent [employer] message.    Next Steps:  Reach out within 90 days via [employer].    Max number of attempts reached: No. Will try again in 90 days if patient still on fail list.    Questions for provider review:    None     Vanessa Houston MA

## 2022-12-12 DIAGNOSIS — F33.8 SEASONAL AFFECTIVE DISORDER (H): ICD-10-CM

## 2022-12-16 DIAGNOSIS — F33.8 SEASONAL AFFECTIVE DISORDER (H): ICD-10-CM

## 2022-12-16 RX ORDER — BUPROPION HYDROCHLORIDE 150 MG/1
300 TABLET ORAL EVERY MORNING
Qty: 180 TABLET | Refills: 1 | OUTPATIENT
Start: 2022-12-16

## 2022-12-16 RX ORDER — BUPROPION HYDROCHLORIDE 150 MG/1
300 TABLET ORAL EVERY MORNING
Qty: 180 TABLET | Refills: 1 | Status: SHIPPED | OUTPATIENT
Start: 2022-12-16 | End: 2024-01-12

## 2023-01-03 DIAGNOSIS — F33.8 SEASONAL AFFECTIVE DISORDER (H): ICD-10-CM

## 2023-01-03 RX ORDER — BUPROPION HYDROCHLORIDE 300 MG/1
TABLET ORAL
Qty: 30 TABLET | Refills: 0 | Status: SHIPPED | OUTPATIENT
Start: 2023-01-03 | End: 2023-01-31

## 2023-01-31 DIAGNOSIS — F33.8 SEASONAL AFFECTIVE DISORDER (H): ICD-10-CM

## 2023-01-31 RX ORDER — BUPROPION HYDROCHLORIDE 300 MG/1
TABLET ORAL
Qty: 30 TABLET | Refills: 0 | Status: SHIPPED | OUTPATIENT
Start: 2023-01-31 | End: 2023-03-02

## 2023-02-14 ENCOUNTER — TELEPHONE (OUTPATIENT)
Dept: FAMILY MEDICINE | Facility: CLINIC | Age: 43
End: 2023-02-14
Payer: COMMERCIAL

## 2023-02-14 NOTE — TELEPHONE ENCOUNTER
Patient Quality Outreach    Patient is due for the following:   Cervical Cancer Screening - PAP Needed    Next Steps:   Schedule a Adult Preventative    Type of outreach:    Sent [employer] message.    Next Steps:  Reach out within 90 days via [employer].    Max number of attempts reached: Yes. Will try again in 90 days if patient still on fail list.    Questions for provider review:    None     Lizz Perdue, [facility]  Chart routed to Care Team.

## 2023-03-02 DIAGNOSIS — F33.8 SEASONAL AFFECTIVE DISORDER (H): ICD-10-CM

## 2023-03-02 RX ORDER — BUPROPION HYDROCHLORIDE 300 MG/1
TABLET ORAL
Qty: 45 TABLET | Refills: 0 | Status: SHIPPED | OUTPATIENT
Start: 2023-03-02 | End: 2023-04-18

## 2023-04-09 ENCOUNTER — HEALTH MAINTENANCE LETTER (OUTPATIENT)
Age: 43
End: 2023-04-09

## 2023-04-17 DIAGNOSIS — F33.8 SEASONAL AFFECTIVE DISORDER (H): ICD-10-CM

## 2023-04-18 RX ORDER — BUPROPION HYDROCHLORIDE 300 MG/1
TABLET ORAL
Qty: 30 TABLET | Refills: 0 | Status: SHIPPED | OUTPATIENT
Start: 2023-04-18 | End: 2023-05-15

## 2023-05-13 DIAGNOSIS — F33.8 SEASONAL AFFECTIVE DISORDER (H): ICD-10-CM

## 2023-05-15 RX ORDER — BUPROPION HYDROCHLORIDE 300 MG/1
TABLET ORAL
Qty: 90 TABLET | Refills: 0 | Status: SHIPPED | OUTPATIENT
Start: 2023-05-15 | End: 2023-09-19

## 2023-05-15 NOTE — TELEPHONE ENCOUNTER
"I sent her a phq-9  Wouldn't contiuing the med change dx from seasonal?  Requested Prescriptions   Pending Prescriptions Disp Refills    buPROPion (WELLBUTRIN XL) 300 MG 24 hr tablet [Pharmacy Med Name: BUPROPION HCL  MG TABLET] 30 tablet 0     Sig: TAKE 1 TABLET BY MOUTH EVERY DAY       SSRIs Protocol Failed - 5/13/2023  9:32 AM        Failed - PHQ-9 score less than 5 in past 6 months     Please review last PHQ-9 score.           Passed - Medication is Bupropion     If the medication is Bupropion (Wellbutrin), and the patient is taking for smoking cessation; OK to refill.            Passed - Medication is active on med list        Passed - Patient is age 18 or older        Passed - No active pregnancy on record        Passed - No positive pregnancy test in last 12 months        Passed - Recent (6 mo) or future (30 days) visit within the authorizing provider's specialty     Patient had office visit in the last 6 months or has a visit in the next 30 days with authorizing provider or within the authorizing provider's specialty.  See \"Patient Info\" tab in inbasket, or \"Choose Columns\" in Meds & Orders section of the refill encounter.                 "

## 2023-07-01 ENCOUNTER — HEALTH MAINTENANCE LETTER (OUTPATIENT)
Age: 43
End: 2023-07-01

## 2023-09-19 ENCOUNTER — OFFICE VISIT (OUTPATIENT)
Dept: FAMILY MEDICINE | Facility: CLINIC | Age: 43
End: 2023-09-19
Payer: COMMERCIAL

## 2023-09-19 VITALS
RESPIRATION RATE: 10 BRPM | DIASTOLIC BLOOD PRESSURE: 78 MMHG | TEMPERATURE: 99.3 F | SYSTOLIC BLOOD PRESSURE: 151 MMHG | WEIGHT: 134.7 LBS | HEIGHT: 67 IN | BODY MASS INDEX: 21.14 KG/M2 | OXYGEN SATURATION: 100 % | HEART RATE: 51 BPM

## 2023-09-19 DIAGNOSIS — Z12.11 SCREEN FOR COLON CANCER: ICD-10-CM

## 2023-09-19 DIAGNOSIS — F33.8 SEASONAL AFFECTIVE DISORDER (H): ICD-10-CM

## 2023-09-19 DIAGNOSIS — Z00.00 ROUTINE GENERAL MEDICAL EXAMINATION AT A HEALTH CARE FACILITY: ICD-10-CM

## 2023-09-19 DIAGNOSIS — Z12.31 VISIT FOR SCREENING MAMMOGRAM: Primary | ICD-10-CM

## 2023-09-19 DIAGNOSIS — R53.83 FATIGUE, UNSPECIFIED TYPE: ICD-10-CM

## 2023-09-19 DIAGNOSIS — Z13.220 LIPID SCREENING: ICD-10-CM

## 2023-09-19 DIAGNOSIS — Z13.1 SCREENING FOR DIABETES MELLITUS: ICD-10-CM

## 2023-09-19 DIAGNOSIS — Z12.4 CERVICAL CANCER SCREENING: ICD-10-CM

## 2023-09-19 LAB
BASOPHILS # BLD AUTO: 0 10E3/UL (ref 0–0.2)
BASOPHILS NFR BLD AUTO: 0 %
EOSINOPHIL # BLD AUTO: 0 10E3/UL (ref 0–0.7)
EOSINOPHIL NFR BLD AUTO: 1 %
ERYTHROCYTE [DISTWIDTH] IN BLOOD BY AUTOMATED COUNT: 12 % (ref 10–15)
HCT VFR BLD AUTO: 38.9 % (ref 35–47)
HGB BLD-MCNC: 13.1 G/DL (ref 11.7–15.7)
IMM GRANULOCYTES # BLD: 0 10E3/UL
IMM GRANULOCYTES NFR BLD: 0 %
LYMPHOCYTES # BLD AUTO: 2.7 10E3/UL (ref 0.8–5.3)
LYMPHOCYTES NFR BLD AUTO: 36 %
MCH RBC QN AUTO: 33.2 PG (ref 26.5–33)
MCHC RBC AUTO-ENTMCNC: 33.7 G/DL (ref 31.5–36.5)
MCV RBC AUTO: 99 FL (ref 78–100)
MONOCYTES # BLD AUTO: 0.5 10E3/UL (ref 0–1.3)
MONOCYTES NFR BLD AUTO: 7 %
NEUTROPHILS # BLD AUTO: 4.2 10E3/UL (ref 1.6–8.3)
NEUTROPHILS NFR BLD AUTO: 56 %
PLATELET # BLD AUTO: 230 10E3/UL (ref 150–450)
RBC # BLD AUTO: 3.95 10E6/UL (ref 3.8–5.2)
WBC # BLD AUTO: 7.5 10E3/UL (ref 4–11)

## 2023-09-19 PROCEDURE — G0145 SCR C/V CYTO,THINLAYER,RESCR: HCPCS | Performed by: FAMILY MEDICINE

## 2023-09-19 PROCEDURE — 80061 LIPID PANEL: CPT | Performed by: FAMILY MEDICINE

## 2023-09-19 PROCEDURE — 99396 PREV VISIT EST AGE 40-64: CPT | Performed by: FAMILY MEDICINE

## 2023-09-19 PROCEDURE — 87624 HPV HI-RISK TYP POOLED RSLT: CPT | Performed by: FAMILY MEDICINE

## 2023-09-19 PROCEDURE — 99213 OFFICE O/P EST LOW 20 MIN: CPT | Mod: 25 | Performed by: FAMILY MEDICINE

## 2023-09-19 PROCEDURE — 85025 COMPLETE CBC W/AUTO DIFF WBC: CPT | Performed by: FAMILY MEDICINE

## 2023-09-19 PROCEDURE — 36415 COLL VENOUS BLD VENIPUNCTURE: CPT | Performed by: FAMILY MEDICINE

## 2023-09-19 RX ORDER — BUPROPION HYDROCHLORIDE 300 MG/1
300 TABLET ORAL DAILY
Qty: 90 TABLET | Refills: 3 | Status: SHIPPED | OUTPATIENT
Start: 2023-09-19

## 2023-09-19 ASSESSMENT — ENCOUNTER SYMPTOMS
WEAKNESS: 0
FEVER: 0
COUGH: 0
JOINT SWELLING: 0
BREAST MASS: 1
NAUSEA: 0
NERVOUS/ANXIOUS: 0
CONSTIPATION: 0
DIZZINESS: 0
CHILLS: 0
HEARTBURN: 0
HEMATURIA: 0
ABDOMINAL PAIN: 0
ARTHRALGIAS: 0
DYSURIA: 0
MYALGIAS: 0
PALPITATIONS: 0
DIARRHEA: 0
PARESTHESIAS: 0
HEADACHES: 0
SHORTNESS OF BREATH: 0
SORE THROAT: 0
FREQUENCY: 0
EYE PAIN: 0
HEMATOCHEZIA: 0

## 2023-09-19 ASSESSMENT — PAIN SCALES - GENERAL: PAINLEVEL: NO PAIN (0)

## 2023-09-19 NOTE — PROGRESS NOTES
SUBJECTIVE:   CC: Kathya is an 43 year old who presents for preventive health visit.       2023     4:19 PM   Additional Questions   Roomed by Doreen Murphy       Healthy Habits:     Getting at least 3 servings of Calcium per day:  Yes    Bi-annual eye exam:  NO    Dental care twice a year:  NO    Sleep apnea or symptoms of sleep apnea:  None    Diet:  Vegetarian/vegan    Frequency of exercise:  6-7 days/week    Duration of exercise:  Greater than 60 minutes    Taking medications regularly:  Yes    Medication side effects:  None    Additional concerns today:  Yes    History of ASC-H on 3/16/16, hpv negative   Surgical path  3/16/16 had leep that showed mild squamous atypia suggestive of hpv cytopathic effects   Lab results for paps and ctyology [facility]   18 normal pap with hpv negative     First pap today since then     Has MDD with SADD.  Tried to come off of wellbutrin this summer with more sun but felt down so started it again  Would like to take year round     Have you ever done Advance Care Planning? (For example, a Health Directive, POLST, or a discussion with a medical provider or your loved ones about your wishes): No, advance care planning information given to patient to review.  Patient plans to discuss their wishes with loved ones or provider.      Social History     Tobacco Use    Smoking status: Never    Smokeless tobacco: Never   Substance Use Topics    Alcohol use: Yes     Comment: rare 1-2 a month             2023     4:10 PM   Alcohol Use   Prescreen: >3 drinks/day or >7 drinks/week? No          No data to display              Reviewed orders with patient.  Reviewed health maintenance and updated orders accordingly - Yes    Breast Cancer Screenin/17/2022    11:11 AM   Breast CA Risk Assessment (FHS-7)   Do you have a family history of breast, colon, or ovarian cancer? No / Unknown     Pertinent mammograms are reviewed under the  "imaging tab.    History of abnormal Pap smear: yes - was due 2 years ago       Latest Ref Rng & Units 9/19/2023     4:59 PM   PAP / HPV   PAP  Negative for Intraepithelial Lesion or Malignancy (NILM)    HPV 16 DNA Negative Negative    HPV 18 DNA Negative Negative    Other HR HPV Negative Negative      Reviewed and updated as needed this visit by clinical staff   Tobacco  Allergies  Meds     Fam Hx  Soc Hx        Reviewed and updated as needed this visit by Provider   Tobacco       Fam Hx  Soc Hx       Review of Systems   Constitutional:  Negative for chills and fever.   HENT:  Negative for congestion, ear pain, hearing loss and sore throat.    Eyes:  Negative for pain and visual disturbance.   Respiratory:  Negative for cough and shortness of breath.    Cardiovascular:  Negative for chest pain, palpitations and peripheral edema.   Gastrointestinal:  Negative for abdominal pain, constipation, diarrhea, heartburn, hematochezia and nausea.   Breasts:  Positive for breast mass. Negative for tenderness and discharge.   Genitourinary:  Negative for dysuria, frequency, genital sores, hematuria, pelvic pain, urgency, vaginal bleeding and vaginal discharge.   Musculoskeletal:  Negative for arthralgias, joint swelling and myalgias.   Skin:  Negative for rash.   Neurological:  Negative for dizziness, weakness, headaches and paresthesias.   Psychiatric/Behavioral:  Negative for mood changes. The patient is not nervous/anxious.         OBJECTIVE:   BP (!) 151/78   Pulse 51   Temp 99.3  F (37.4  C) (Temporal)   Resp 10   Ht 1.71 m (5' 7.32\")   Wt 61.1 kg (134 lb 11.2 oz)   LMP 09/09/2023 (Exact Date)   SpO2 100%   BMI 20.90 kg/m    Physical Exam  Constitutional:       General: She is not in acute distress.  HENT:      Head: Normocephalic.      Right Ear: Tympanic membrane normal.      Left Ear: Tympanic membrane normal.      Mouth/Throat:      Mouth: Mucous membranes are moist.      Pharynx: Oropharynx is clear. "   Eyes:      General: No scleral icterus.  Cardiovascular:      Rate and Rhythm: Normal rate and regular rhythm.      Heart sounds: Normal heart sounds.   Pulmonary:      Effort: No respiratory distress.      Breath sounds: Normal breath sounds.   Abdominal:      General: Abdomen is flat. Bowel sounds are normal.      Palpations: Abdomen is soft.   Musculoskeletal:      Cervical back: No tenderness.      Right lower leg: No edema.      Left lower leg: No edema.   Lymphadenopathy:      Cervical: No cervical adenopathy.   Skin:     General: Skin is warm.   Neurological:      General: No focal deficit present.      Mental Status: She is alert.   Psychiatric:         Mood and Affect: Mood normal.         Behavior: Behavior normal.           ASSESSMENT/PLAN:       ICD-10-CM    1. Visit for screening mammogram  Z12.31 MA SCREENING DIGITAL BILAT - Future  (s+30)      2. Seasonal affective disorder (H)  F33.8 buPROPion (WELLBUTRIN XL) 300 MG 24 hr tablet      3. Screen for colon cancer  Z12.11 Colonoscopy Screening  Referral      4. Cervical cancer screening  Z12.4 Pap Screen with HPV - recommended age 30 - 65 years     HPV Hold (Lab Only)     HPV High Risk Types DNA Cervical      5. Lipid screening  Z13.220 Lipid panel reflex to direct LDL Non-fasting     Lipid panel reflex to direct LDL Non-fasting      6. Screening for diabetes mellitus  Z13.1       7. Routine general medical examination at a health care facility  Z00.00 REVIEW OF HEALTH MAINTENANCE PROTOCOL ORDERS      8. Fatigue, unspecified type  R53.83 CBC with platelets and differential     CBC with platelets and differential          Patient has been advised of split billing requirements and indicates understanding: Yes      COUNSELING:  Reviewed preventive health counseling, as reflected in patient instructions        She reports that she has never smoked. She has never used smokeless tobacco.          Kush Smith DO  [facility]  [facility]

## 2023-09-20 LAB
CHOLEST SERPL-MCNC: 155 MG/DL
HDLC SERPL-MCNC: 76 MG/DL
LDLC SERPL CALC-MCNC: 69 MG/DL
NONHDLC SERPL-MCNC: 79 MG/DL
TRIGL SERPL-MCNC: 52 MG/DL

## 2023-09-22 LAB
BKR LAB AP GYN ADEQUACY: NORMAL
BKR LAB AP GYN INTERPRETATION: NORMAL
BKR LAB AP HPV REFLEX: NORMAL
BKR LAB AP PREVIOUS ABNL DX: NORMAL
BKR LAB AP PREVIOUS ABNORMAL: NORMAL
PATH REPORT.COMMENTS IMP SPEC: NORMAL
PATH REPORT.COMMENTS IMP SPEC: NORMAL
PATH REPORT.RELEVANT HX SPEC: NORMAL

## 2023-09-25 LAB
HUMAN PAPILLOMA VIRUS 16 DNA: NEGATIVE
HUMAN PAPILLOMA VIRUS 18 DNA: NEGATIVE
HUMAN PAPILLOMA VIRUS FINAL DIAGNOSIS: NORMAL
HUMAN PAPILLOMA VIRUS OTHER HR: NEGATIVE

## 2023-09-26 ENCOUNTER — PATIENT OUTREACH (OUTPATIENT)
Dept: FAMILY MEDICINE | Facility: CLINIC | Age: 43
End: 2023-09-26
Payer: COMMERCIAL

## 2023-10-30 ENCOUNTER — TELEPHONE (OUTPATIENT)
Dept: GASTROENTEROLOGY | Facility: CLINIC | Age: 43
End: 2023-10-30
Payer: COMMERCIAL

## 2023-10-30 NOTE — TELEPHONE ENCOUNTER
"Endoscopy Scheduling Screen    Have you had a positive Covid test in the last 14 days?  No    Are you active on MyChart?   Yes    What insurance is in the chart?  Other:  [employer]    Ordering/Referring Provider:     ELISE BOO      (If ordering provider performs procedure, schedule with ordering provider unless otherwise instructed. )    BMI: Estimated body mass index is 20.9 kg/m  as calculated from the following:    Height as of 9/19/23: 1.71 m (5' 7.32\").    Weight as of 9/19/23: 61.1 kg (134 lb 11.2 oz).     Sedation Ordered  moderate sedation.   If patient BMI > 50 do not schedule in [facility].    If patient BMI > 45 do not schedule at [facility].    Are you taking methadone or Suboxone?  No    Are you taking any prescription medications for pain 3 or more times per week?   No    Do you have a history of malignant hyperthermia or adverse reaction to anesthesia?  No    (Females) Are you currently pregnant?   No     Have you been diagnosed or told you have pulmonary hypertension?   No    Do you have an LVAD?  No    Have you been told you have moderate to severe sleep apnea?  No    Have you been told you have COPD, asthma, or any other lung disease?  No    Do you have any heart conditions?  No     Have you ever had an organ transplant?   No    Have you ever had or are you awaiting a heart or lung transplant?   No    Have you had a stroke or transient ischemic attack (TIA aka \"mini stroke\" in the last 6 months?   No    Have you been diagnosed with or been told you have cirrhosis of the liver?   No    Are you currently on dialysis?   No    Do you need assistance transferring?   No    BMI: Estimated body mass index is 20.9 kg/m  as calculated from the following:    Height as of 9/19/23: 1.71 m (5' 7.32\").    Weight as of 9/19/23: 61.1 kg (134 lb 11.2 oz).     Is patients BMI > 40 and scheduling location [facility]?  No    Do you take an injectable medication for weight loss or diabetes (excluding insulin)?  No    Do you take the " medication Naltrexone?  No    Do you take blood thinners?  No       Prep   Are you currently on dialysis or do you have chronic kidney disease?  No    Do you have a diagnosis of diabetes?  No    Do you have a diagnosis of cystic fibrosis (CF)?  No    On a regular basis do you go 3 -5 days between bowel movements?  No    BMI > 40?  No    Preferred Pharmacy:    [id number] [address]  [address] [address]  Phone: [phone] Fax: [phone]      Final Scheduling Details   Colonoscopy prep sent?  Standard MiraLAX    Procedure scheduled  Colonoscopy    Surgeon:  LEVENTHAL     Date of procedure:  02/07/2024     Pre-OP / PAC:   No - Not required for this site.    Location  [facility] - [facility] - Per order.    Sedation   Moderate Sedation - Per order.      Patient Reminders:   You will receive a call from a Nurse to review instructions and health history.  This assessment must be completed prior to your procedure.  Failure to complete the Nurse assessment may result in the procedure being cancelled.      On the day of your procedure, please designate an adult(s) who can drive you home stay with you for the next 24 hours. The medicines used in the exam will make you sleepy. You will not be able to drive.      You cannot take public transportation, ride share services, or non-medical taxi service without a responsible caregiver.  Medical transport services are allowed with the requirement that a responsible caregiver will receive you at your destination.  We require that drivers and caregivers are confirmed prior to your procedure.

## 2023-11-15 ENCOUNTER — VIRTUAL VISIT (OUTPATIENT)
Dept: FAMILY MEDICINE | Facility: CLINIC | Age: 43
End: 2023-11-15
Payer: COMMERCIAL

## 2023-11-15 DIAGNOSIS — R41.840 ATTENTION AND CONCENTRATION DEFICIT: Primary | ICD-10-CM

## 2023-11-15 PROCEDURE — 99213 OFFICE O/P EST LOW 20 MIN: CPT | Mod: VID | Performed by: FAMILY MEDICINE

## 2023-11-15 ASSESSMENT — PATIENT HEALTH QUESTIONNAIRE - PHQ9
10. IF YOU CHECKED OFF ANY PROBLEMS, HOW DIFFICULT HAVE THESE PROBLEMS MADE IT FOR YOU TO DO YOUR WORK, TAKE CARE OF THINGS AT HOME, OR GET ALONG WITH OTHER PEOPLE: SOMEWHAT DIFFICULT
SUM OF ALL RESPONSES TO PHQ QUESTIONS 1-9: 8
SUM OF ALL RESPONSES TO PHQ QUESTIONS 1-9: 8

## 2023-11-15 NOTE — PROGRESS NOTES
Kathya is a 43 year old who is being evaluated via a billable video visit.      How would you like to obtain your AVS? MyChart  If the video visit is dropped, the invitation should be resent by: Send to e-mail at: [email or web address]  Will anyone else be joining your video visit? No          Assessment & Plan     Attention and concentration deficit  She would like to pursue testing will refer if dxd will initiate treatment   - Adult Mental Health  Referral; Future         CONSULTATION/REFERRAL to psychology     Lou Washington MD  [facility]   Kathya is a 43 year old, presenting for the following health issues:  Behavioral Problem (Discuss ADHD, inattentive and focus concerns. Has not had an evaluation. )    HPI   She is thinking she has issues with adhd she did not have trouble in school . She has a daughter with this and she feels that she likely has had this also   She feels that it is now intruding on her work and she would like to see if she is dxd with this  Probably with symptoms prior to age 10 but had been able to work through this.           Review of Systems         Objective           Vitals:  No vitals were obtained today due to virtual visit.    Physical Exam   GENERAL: Healthy, alert and no distress  EYES: Eyes grossly normal to inspection.  No discharge or erythema, or obvious scleral/conjunctival abnormalities.  RESP: No audible wheeze, cough, or visible cyanosis.  No visible retractions or increased work of breathing.    SKIN: Visible skin clear. No significant rash, abnormal pigmentation or lesions.  NEURO: Cranial nerves grossly intact.  Mentation and speech appropriate for age.  PSYCH: Mentation appears normal, affect normal/bright, judgement and insight intact, normal speech and appearance well-groomed.                Video-Visit Details    Type of service:  Video Visit     Originating Location (pt. Location): Home    Distant Location (provider  location):  On-site  Platform used for Video Visit: Radha Washington M.D.

## 2023-12-27 ENCOUNTER — TELEPHONE (OUTPATIENT)
Dept: ORTHOPEDICS | Facility: CLINIC | Age: 43
End: 2023-12-27
Payer: COMMERCIAL

## 2023-12-27 DIAGNOSIS — R22.41 ANKLE MASS, RIGHT: Primary | ICD-10-CM

## 2023-12-27 DIAGNOSIS — M25.571 PAIN IN JOINT, ANKLE AND FOOT, RIGHT: Primary | ICD-10-CM

## 2023-12-27 NOTE — CONFIDENTIAL NOTE
Patient has a history of a right lateral ankle mass by Achilles tendon. Given this a right ankle MRI with and without contrast ordered. Patient to follow-up in clinic after to go over results.    Jalen Khan MD

## 2023-12-27 NOTE — TELEPHONE ENCOUNTER
[facility] Call Center    Phone Message    May a detailed message be left on voicemail: yes     Reason for Call: Other: pt calling would like to schedule an appt requesting for Dr. King, can care team review and evaluate. Per pt possible mass on Right foot achillies tendon pain has seen him in the past / [employer] / img 11/15/22. DOS 09/07/22. Writer unable to schedule within 3 days per [facility] protcol. Can care team call pt to schedule? [facility].      Action Taken: Other: [facility] [[id number]]    Travel Screening: Not Applicable

## 2023-12-27 NOTE — TELEPHONE ENCOUNTER
I spoke with Kathya twice and consulted the orthopedic oncology team in between. She states she has a palpable mass around her achilles tendon. Delma Díaz offered to order an ankle MRI and then have Kathya see one of our ortho oncology providers. She agreed and will have the scan done at [place]. She does not have any imaging she can send us. The [facility] but not stored in the system.  I will have our tumor team reach out to Kathya to schedule the scan and follow up.  She had no further questions.  Eloina Lobato ATC

## 2023-12-28 ENCOUNTER — TELEPHONE (OUTPATIENT)
Dept: ORTHOPEDICS | Facility: CLINIC | Age: 43
End: 2023-12-28
Payer: COMMERCIAL

## 2023-12-28 NOTE — CONFIDENTIAL NOTE
Attempted to call patient. LVM requesting callback to scheduled follow up appointment with Dr. Durbin, Dr. Gonsales, or Dr. Hernandez after her MRI on January 8th. In person or virtual appointment okay. Clinic phone number provided.    Tara Holter, RNCC

## 2023-12-28 NOTE — CONFIDENTIAL NOTE
Returned call to patient. She has her MRI scheduled December 30th at [facility] in [place]. Virtual visit scheduled with Dr. Gonsales January 9th at 2:30.    Tara Holter, RNCC

## 2023-12-28 NOTE — TELEPHONE ENCOUNTER
INPATIENT CONSULT    Patient: Joaquim Orr Attending: Amie Hairston, *   : mercer consultation.    Joaquim Orr is a 73 [facility] Call Center    Phone Message    May a detailed message be left on voicemail: yes     Reason for Call: Other: per [facility] protocol send TE if unable to schedule within 3 business days. Pt MRI pending 01/08 for R foot achilles mass. Can care team review and evaluate and call pt to schedule?     Action Taken: Other: P Orthopedic Oncology Team    Travel Screening: Not Applicable                                                                    Trino Stockton MD   Authorized by: Trino Stockton draining appropriately.    - Maintain mercer [facility]

## 2023-12-30 ENCOUNTER — TRANSFERRED RECORDS (OUTPATIENT)
Dept: HEALTH INFORMATION MANAGEMENT | Facility: CLINIC | Age: 43
End: 2023-12-30
Payer: COMMERCIAL

## 2024-01-04 NOTE — TELEPHONE ENCOUNTER
Action January 4, 2024 3:11 PM MT   Action Taken [facility] report sent to scan. Imaging resolved.       DIAGNOSIS: RT Achilles Tendon Mass   APPOINTMENT DATE: 01/09/2024- Appt changed to 1/11/2024   NOTES STATUS DETAILS   OFFICE NOTE from referring provider Internal    OPERATIVE REPORT Care Everywhere 09/07/2022 - RT 1st Metatarsale Distal Osteotomy with akin osteotomy and RIGHT 2nd and 3rd metatarsal osteotomy with 2nd toe pinning.   MRI PACS MRI Ankle 1/8/2024     [facility]:  12/30/2023 -  RT Foot   XRAYS (IMAGES & REPORTS) PACS Internal

## 2024-01-08 ENCOUNTER — HOSPITAL ENCOUNTER (OUTPATIENT)
Dept: MRI IMAGING | Facility: CLINIC | Age: 44
Discharge: HOME | End: 2024-01-08
Attending: FAMILY MEDICINE | Admitting: FAMILY MEDICINE
Payer: COMMERCIAL

## 2024-01-08 ENCOUNTER — TELEPHONE (OUTPATIENT)
Dept: ORTHOPEDICS | Facility: CLINIC | Age: 44
End: 2024-01-08
Payer: COMMERCIAL

## 2024-01-08 DIAGNOSIS — R22.41 ANKLE MASS, RIGHT: ICD-10-CM

## 2024-01-08 PROCEDURE — 73721 MRI JNT OF LWR EXTRE W/O DYE: CPT | Mod: 26 | Performed by: RADIOLOGY

## 2024-01-08 PROCEDURE — 73721 MRI JNT OF LWR EXTRE W/O DYE: CPT | Mod: RT

## 2024-01-08 RX ORDER — GADOBUTROL 604.72 MG/ML
6 INJECTION INTRAVENOUS ONCE
Status: DISCONTINUED | OUTPATIENT
Start: 2024-01-08 | End: 2024-01-08

## 2024-01-08 NOTE — TELEPHONE ENCOUNTER
[facility]    Phone Message    May a detailed message be left on voicemail: yes     Reason for Call:   Patient has Appt on Jan 9 . She want to Reschedule. Nothing coming up Soon. Please Assist with Rescheduling This A  Action Taken: Message routed to:  [facility] ([facility]): hermelinda    Travel Screening: Not Applicable

## 2024-01-08 NOTE — TELEPHONE ENCOUNTER
Called pt and left a VM for call back. Ok to offer in-clinic visit with Dr. Gonsales this Thursday.           -Camilo ATC- [facility]

## 2024-01-09 ENCOUNTER — PRE VISIT (OUTPATIENT)
Dept: ORTHOPEDICS | Facility: CLINIC | Age: 44
End: 2024-01-09

## 2024-01-11 ENCOUNTER — OFFICE VISIT (OUTPATIENT)
Dept: ORTHOPEDICS | Facility: CLINIC | Age: 44
End: 2024-01-11
Payer: COMMERCIAL

## 2024-01-11 DIAGNOSIS — D21.21 BENIGN NEOPLASM OF SOFT TISSUES OF RIGHT LOWER EXTREMITY: Primary | ICD-10-CM

## 2024-01-11 PROCEDURE — 99213 OFFICE O/P EST LOW 20 MIN: CPT | Performed by: ORTHOPAEDIC SURGERY

## 2024-01-11 NOTE — NURSING NOTE
Chief Complaint   Patient presents with    Consult     Right ankle mass around achilles // pt reports that she's a runner and pain started around 4-5 weeks ago while she was running        43 year old  1980          Pain Assessment  Patient Currently in Pain: Tk           [facility] [id number] [place] [address]        Allergies   Allergen Reactions    Phenothiazines      Distonic - facial dystonia           Current Outpatient Medications   Medication    buPROPion (WELLBUTRIN XL) 150 MG 24 hr tablet    buPROPion (WELLBUTRIN XL) 300 MG 24 hr tablet    cholecalciferol (VITAMIN D) 200 units (5 mcg) TABS half-tab    Ferrous Gluconate 225 (27 Fe) MG TABS    levonorgestrel (MIRENA) 20 MCG/DAY IUD    levonorgestrel (MIRENA) 52 MG (20 mcg/day) IUD    melatonin 3 MG tablet    Multiple Vitamin (MULTIVITAMIN ADULT PO)     No current facility-administered medications for this visit.

## 2024-01-11 NOTE — LETTER
1/11/2024         RE: Kathya Mathews  [address]  [facility] [address]        Dear Colleague,    Thank you for referring your patient, Kathya Mathews, to the [facility] [place]. Please see a copy of my visit note below.    Impression: Less than 1 cm apparent mass anterolateral to the right Achilles tendon with fluctuating symptoms.    Plan: 1.  Will observe the lesion for now as it is currently asymptomatic.  2.  If symptoms recur, depending on the timeframe, we will likely repeat the MRI to confirm the presence of the lesion.  If it is present and symptomatic we would consider surgical excision as an outpatient.    Patient is a 43-year-old female who developed a painful mass 4 to 5 weeks ago.  She is an avid runner running 80 to 90 miles per week.  She reported swelling and pain in the region just lateral to the Achilles tendon on the right leg.  She is wondering if surgical removal of the mass would be indicated.  She has undergone MRI and ultrasound imaging.    Importantly she recently injured her hamstrings and has not run for a while.  With this period of rest she no longer is having symptoms.    On exam she can localize the area of prior tenderness.  This is not tender today.  To my exam there is no clear palpable mass.    I reviewed 2 MRI scans as well as an ultrasound scan which she showed me on her telephone.  The lesion is best seen on the December MRI scan is less than 1 cm and is located anterior lateral to the Achilles tendon.  The signal characteristics of the lesions are not necessarily indicative of a neoplasm but are suspicious for an area of abnormality that could cause symptoms.    I reviewed this with Kathya.  All her questions were answered.  We will proceed as outlined above in the plan.  Study of the images prior to her visit but on the day of the visit as well as face-to-face time with the patient was greater than 20 minutes.      Helder Martinez  MD Ilda

## 2024-01-12 NOTE — PATIENT INSTRUCTIONS
Impression: Less than 1 cm apparent mass anterolateral to the Achilles tendon with fluctuating symptoms.    Plan: 1.  Will observe the lesion for now as it is currently asymptomatic.  2.  If symptoms recur, depending on the timeframe, we will likely repeat the MRI to confirm the presence of the lesion.  If it is present and symptomatic we would consider surgical excision as an outpatient.

## 2024-01-12 NOTE — PROGRESS NOTES
Impression: Less than 1 cm apparent mass anterolateral to the right Achilles tendon with fluctuating symptoms.    Plan: 1.  Will observe the lesion for now as it is currently asymptomatic.  2.  If symptoms recur, depending on the timeframe, we will likely repeat the MRI to confirm the presence of the lesion.  If it is present and symptomatic we would consider surgical excision as an outpatient.    Patient is a 43-year-old female who developed a painful mass 4 to 5 weeks ago.  She is an avid runner running 80 to 90 miles per week.  She reported swelling and pain in the region just lateral to the Achilles tendon on the right leg.  She is wondering if surgical removal of the mass would be indicated.  She has undergone MRI and ultrasound imaging.    Importantly she recently injured her hamstrings and has not run for a while.  With this period of rest she no longer is having symptoms.    On exam she can localize the area of prior tenderness.  This is not tender today.  To my exam there is no clear palpable mass.    I reviewed 2 MRI scans as well as an ultrasound scan which she showed me on her telephone.  The lesion is best seen on the December MRI scan is less than 1 cm and is located anterior lateral to the Achilles tendon.  The signal characteristics of the lesions are not necessarily indicative of a neoplasm but are suspicious for an area of abnormality that could cause symptoms.    I reviewed this with Kathya.  All her questions were answered.  We will proceed as outlined above in the plan.  Study of the images prior to her visit but on the day of the visit as well as face-to-face time with the patient was greater than 20 minutes.

## 2024-01-25 ENCOUNTER — TELEPHONE (OUTPATIENT)
Dept: GASTROENTEROLOGY | Facility: CLINIC | Age: 44
End: 2024-01-25
Payer: COMMERCIAL

## 2024-01-25 NOTE — TELEPHONE ENCOUNTER
Pre visit planning completed.      Procedure details:    Patient scheduled for Colonoscopy  on 2/7/24.     Arrival time: 0900. Procedure time 1000    Pre op exam needed? N/A    Facility location: [facility]; [address] [place] [address], [address] Floor, [place] [address]    Sedation type: Conscious sedation     Indication for procedure: screening      Chart review:     Electronic implanted devices? No    Recent diagnosis of diverticulitis within the last 6 weeks? No    Diabetic? No      Medication review:    Anticoagulants? No    NSAIDS? No NSAID medications per patient's medication list.  RN will verify with pre-assessment call.    Other medication HOLDING recommendations:  Iron supplements: HOLD 7 days before procedure.      Prep for procedure:     Bowel prep recommendation: Standard Miralax   Due to:  standard bowel prep.    Prep instructions sent via Index       Katja Young RN  Endoscopy Procedure Pre Assessment RN  [phone] option 4

## 2024-01-26 NOTE — TELEPHONE ENCOUNTER
Pre assessment completed for upcoming procedure.      Procedure details:    Patient scheduled for Colonoscopy  on 2/7/24.     Arrival time: 0900. Procedure time 1000     Pre op exam needed? N/A     Facility location: [facility]; [address] [place] [address], [address] Floor, [place] [address]     Sedation type: Conscious sedation     COVID policy reviewed.    Designated  policy reviewed. Instructed to have someone stay 6 hours post procedure.     Medication review:    NSAIDS? Yes.  Ibuprofen (Advil, Motrin).  Holding interval of 1 day before procedure. and Naproxen (Naprosyn, Aleve).  Holding interval of 4 days.    Other medication HOLDING recommendations:  Iron supplements: HOLD 7 days before procedure.      Prep for procedure:     Reviewed procedure prep instructions.     Patient verbalized understanding and had no questions or concerns at this time.        Priscila Hammer RN  Endoscopy Procedure Pre Assessment RN  [phone] option 4

## 2024-01-29 NOTE — RESULT ENCOUNTER NOTE
Patient contacted via telephone regarding right ankle MRI results.  Given findings plan to treat with monitory symptoms over Achilles tendon, no concerning findings. There is a concern for possible stress reaction over metatarsal prox heads1-3. She is not running currently. Plan to have her work on hamstring exercises (pain more there now vs foot), monitor sx and follow-up if not improving over the next mon.  Patient understands and agrees with plan.     Jalen Khan MD

## 2024-02-06 ENCOUNTER — TELEPHONE (OUTPATIENT)
Dept: GASTROENTEROLOGY | Facility: CLINIC | Age: 44
End: 2024-02-06
Payer: COMMERCIAL

## 2024-02-06 NOTE — TELEPHONE ENCOUNTER
Caller: Kathya  Reason for Reschedule/Cancellation (please be detailed, any staff messages or encounters to note?): Patient sick      Prior to reschedule please review:  Ordering Provider: Luis Valdez Determined: CS  Does patient have any ASC Exclusions, please identify?: N      Notes on Cancelled Procedure:  Procedure: Lower Endoscopy [Colonoscopy]   Date: 2/7/24  Location: [facility] [place]; [address] [place] [address], [address] [place] [address]  Surgeon: Leventhal      Rescheduled: Yes  Procedure: Lower Endoscopy [Colonoscopy]   Date: 6/5/24  Location: [facility] [place]; [address] [place] [address], [place] [address]  Surgeon: Leila  Sedation Level Scheduled  CS,  Reason for Sedation Level Protocol  Prep/Instructions updated and sent: Yes       Send In - basket message to Panc - Will Pool if EUS  procedure is canceled or rescheduled: [ N/A, YES or NO] NA

## 2024-05-03 ENCOUNTER — OFFICE VISIT (OUTPATIENT)
Dept: FAMILY MEDICINE | Facility: CLINIC | Age: 44
End: 2024-05-03
Payer: COMMERCIAL

## 2024-05-03 VITALS
HEART RATE: 60 BPM | TEMPERATURE: 98 F | BODY MASS INDEX: 21.19 KG/M2 | SYSTOLIC BLOOD PRESSURE: 114 MMHG | DIASTOLIC BLOOD PRESSURE: 72 MMHG | WEIGHT: 135 LBS | RESPIRATION RATE: 16 BRPM | OXYGEN SATURATION: 100 % | HEIGHT: 67 IN

## 2024-05-03 DIAGNOSIS — F33.8 SEASONAL AFFECTIVE DISORDER (H): ICD-10-CM

## 2024-05-03 DIAGNOSIS — R53.83 FATIGUE, UNSPECIFIED TYPE: Primary | ICD-10-CM

## 2024-05-03 DIAGNOSIS — F33.41 MAJOR DEPRESSIVE DISORDER, RECURRENT EPISODE, IN PARTIAL REMISSION (H): ICD-10-CM

## 2024-05-03 LAB
BASOPHILS # BLD AUTO: 0 10E3/UL (ref 0–0.2)
BASOPHILS NFR BLD AUTO: 0 %
EOSINOPHIL # BLD AUTO: 0 10E3/UL (ref 0–0.7)
EOSINOPHIL NFR BLD AUTO: 1 %
ERYTHROCYTE [DISTWIDTH] IN BLOOD BY AUTOMATED COUNT: 11.6 % (ref 10–15)
HCT VFR BLD AUTO: 39.9 % (ref 35–47)
HGB BLD-MCNC: 13.3 G/DL (ref 11.7–15.7)
IMM GRANULOCYTES # BLD: 0 10E3/UL
IMM GRANULOCYTES NFR BLD: 0 %
LYMPHOCYTES # BLD AUTO: 1.7 10E3/UL (ref 0.8–5.3)
LYMPHOCYTES NFR BLD AUTO: 36 %
MCH RBC QN AUTO: 32.1 PG (ref 26.5–33)
MCHC RBC AUTO-ENTMCNC: 33.3 G/DL (ref 31.5–36.5)
MCV RBC AUTO: 96 FL (ref 78–100)
MONOCYTES # BLD AUTO: 0.3 10E3/UL (ref 0–1.3)
MONOCYTES NFR BLD AUTO: 7 %
NEUTROPHILS # BLD AUTO: 2.7 10E3/UL (ref 1.6–8.3)
NEUTROPHILS NFR BLD AUTO: 56 %
PLATELET # BLD AUTO: 237 10E3/UL (ref 150–450)
RBC # BLD AUTO: 4.14 10E6/UL (ref 3.8–5.2)
WBC # BLD AUTO: 4.8 10E3/UL (ref 4–11)

## 2024-05-03 PROCEDURE — 84443 ASSAY THYROID STIM HORMONE: CPT

## 2024-05-03 PROCEDURE — 82306 VITAMIN D 25 HYDROXY: CPT

## 2024-05-03 PROCEDURE — 83540 ASSAY OF IRON: CPT

## 2024-05-03 PROCEDURE — 82607 VITAMIN B-12: CPT

## 2024-05-03 PROCEDURE — 83550 IRON BINDING TEST: CPT

## 2024-05-03 PROCEDURE — 82728 ASSAY OF FERRITIN: CPT

## 2024-05-03 PROCEDURE — 36415 COLL VENOUS BLD VENIPUNCTURE: CPT

## 2024-05-03 PROCEDURE — 99214 OFFICE O/P EST MOD 30 MIN: CPT

## 2024-05-03 PROCEDURE — 82166 ASSAY ANTI-MULLERIAN HORM: CPT

## 2024-05-03 PROCEDURE — 85025 COMPLETE CBC W/AUTO DIFF WBC: CPT

## 2024-05-03 ASSESSMENT — ANXIETY QUESTIONNAIRES
GAD7 TOTAL SCORE: 3
6. BECOMING EASILY ANNOYED OR IRRITABLE: MORE THAN HALF THE DAYS
4. TROUBLE RELAXING: NOT AT ALL
GAD7 TOTAL SCORE: 3
2. NOT BEING ABLE TO STOP OR CONTROL WORRYING: NOT AT ALL
5. BEING SO RESTLESS THAT IT IS HARD TO SIT STILL: NOT AT ALL
8. IF YOU CHECKED OFF ANY PROBLEMS, HOW DIFFICULT HAVE THESE MADE IT FOR YOU TO DO YOUR WORK, TAKE CARE OF THINGS AT HOME, OR GET ALONG WITH OTHER PEOPLE?: SOMEWHAT DIFFICULT
7. FEELING AFRAID AS IF SOMETHING AWFUL MIGHT HAPPEN: NOT AT ALL
1. FEELING NERVOUS, ANXIOUS, OR ON EDGE: SEVERAL DAYS
IF YOU CHECKED OFF ANY PROBLEMS ON THIS QUESTIONNAIRE, HOW DIFFICULT HAVE THESE PROBLEMS MADE IT FOR YOU TO DO YOUR WORK, TAKE CARE OF THINGS AT HOME, OR GET ALONG WITH OTHER PEOPLE: SOMEWHAT DIFFICULT
7. FEELING AFRAID AS IF SOMETHING AWFUL MIGHT HAPPEN: NOT AT ALL
3. WORRYING TOO MUCH ABOUT DIFFERENT THINGS: NOT AT ALL
GAD7 TOTAL SCORE: 3

## 2024-05-03 ASSESSMENT — PATIENT HEALTH QUESTIONNAIRE - PHQ9
10. IF YOU CHECKED OFF ANY PROBLEMS, HOW DIFFICULT HAVE THESE PROBLEMS MADE IT FOR YOU TO DO YOUR WORK, TAKE CARE OF THINGS AT HOME, OR GET ALONG WITH OTHER PEOPLE: SOMEWHAT DIFFICULT
SUM OF ALL RESPONSES TO PHQ QUESTIONS 1-9: 5
SUM OF ALL RESPONSES TO PHQ QUESTIONS 1-9: 5

## 2024-05-03 ASSESSMENT — PAIN SCALES - GENERAL: PAINLEVEL: NO PAIN (0)

## 2024-05-03 NOTE — PATIENT INSTRUCTIONS
Schedule therapy  [email or web address] - look up therapists    Labs today  - vitamins B12, D, iron/ferritin, thyroid  - AMH anti-mullerian hormone (ovarian function)

## 2024-05-03 NOTE — PROGRESS NOTES
Assessment & Plan     Fatigue, unspecified type  Subacute, unclear etiology.  Ddx includes hypothyroid, anemia, vitamin deficiency, depression, stress, vs early onset menopause  perimenopause  vs other. Labs today to assess for reversible/ contributing factors.   - Anti-Mullerian hormone  - TSH with free T4 reflex  - Ferritin  - Iron and iron binding capacity  - Vitamin B12  - Vitamin D Deficiency  - Adult Mental Health  Referral  - CBC with platelets and differential      Major depressive disorder, recurrent episode, in partial remission (H24)  Chronic, exacerbated, previously stable on bupropion.  A few situational factors possibly contributing to worsening depression including running injury, work stress.  However patient has work on similar injuries without any problems in the past and her work has not changed. D[facility] [employer] to help [facility] a [facility] would like [facility].    - Adult Mental Health  Referral    Seasonal affective disorder (H24)  Chronic, on wellbutrin with good results in the past. No ne Rx today, pt will follow up if wanting to discuss medications.   - Adult Mental Health  Referral                See Patient Instructions    Stephy Rasheed is a 43 year old, presenting for the following health issues:  Mental Health Problem and Fatigue      5/3/2024     8:57 AM   Additional Questions   Roomed by Denise     History of Present Illness       Mental Health Follow-up:  Patient presents to follow-up on Depression.Patient's depression since last visit has been:  Worse  The patient is having other symptoms associated with depression.      Any significant life events: health concerns and other  Patient is not feeling anxious or having panic attacks.  Patient has no concerns about alcohol or drug use.    She eats 2-3 servings of fruits and vegetables daily.She consumes 0 sweetened beverage(s) daily.She exercises  "with enough effort to increase her heart rate 60 or more minutes per day.  She exercises with enough effort to increase her heart rate 7 days per week. She is missing 1 dose(s) of medications per week.     Current Outpatient Medications   Medication Instructions    buPROPion (WELLBUTRIN XL) 300 mg, Oral, DAILY    Ferrous Gluconate 225 (27 Fe) MG TABS No dose, route, or frequency recorded.    levonorgestrel (MIRENA) 52 MG (20 mcg/day) IUD Intrauterine, ONCE    melatonin 3 mg, Oral    Multiple Vitamin (MULTIVITAMIN ADULT PO) Oral, Greens    Pt is new to me.     1. Fatigue / Depression -   Several months, feeling fatigued, apathetic/unmotivated. Irritable.  + Chronic seasonal depression - on bupropion and stable until recently. Takes all year, no dose changes recently. + chronic wax/waning depression, this feels different.   No current therapy.   Tried selective serotonin reuptake inhibitor in the past, +sexual side effects  Denies suicidal thoughts or plans.     Denies difficulty sleeping. Averages 7 hours/night. Endorses she could get more.   Denies appetite or bowel changes.    Periods + mirena IUD + still gets periods/spotting.     Social (changes - //yes)  Activity + Very active, runner. 70-90 miles per week when healthy. Low resting HR.  Currently working through injury - Hamstring tendon, since October. unable to do long runs, missing a couple upcoming races she'd been planning.   + Home PT. Upcoming sports medicine appointment.     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  Supportive friends and family.    Diet + on iron, MVI, vegetarian. She endorses feeling like she's \"not managing\", not cooking/eating healthy like she normally would due to lack of motivation/fatigue.       Social History     Tobacco Use    Smoking status: Never    Smokeless tobacco: Never   Vaping Use    Vaping status: Never Used "   Substance Use Topics    Alcohol use: Yes     Comment: rare 1-2 a month    Drug use: Never         5/15/2023     1:15 PM 11/15/2023     4:39 PM 5/3/2024     8:57 AM   PHQ   PHQ-9 Total Score 3 8 5   Q9: Thoughts of better off dead/self-harm past 2 weeks Not at all Not at all Not at all         5/3/2024     8:58 AM   CT-7 SCORE   Total Score 3 (minimal anxiety)   Total Score 3         5/3/2024     8:57 AM   Last PHQ-9   1.  Little interest or pleasure in doing things 2   2.  Feeling down, depressed, or hopeless 1   3.  Trouble falling or staying asleep, or sleeping too much 0   4.  Feeling tired or having little energy 2   5.  Poor appetite or overeating 0   6.  Feeling bad about yourself 0   7.  Trouble concentrating 0   8.  Moving slowly or restless 0   Q9: Thoughts of better off dead/self-harm past 2 weeks 0   PHQ-9 Total Score 5         5/3/2024     8:58 AM   CT-7    1. Feeling nervous, anxious, or on edge 1   2. Not being able to stop or control worrying 0   3. Worrying too much about different things 0   4. Trouble relaxing 0   5. Being so restless that it is hard to sit still 0   6. Becoming easily annoyed or irritable 2   7. Feeling afraid, as if something awful might happen 0   CT-7 Total Score 3   If you checked any problems, how difficult have they made it for you to do your work, take care of things at home, or get along with other people? Somewhat difficult       Suicide Assessment Five-step Evaluation and Treatment (SAFE-T)      Current Outpatient Medications   Medication Instructions    buPROPion (WELLBUTRIN XL) 300 mg, Oral, DAILY    Ferrous Gluconate 225 (27 Fe) MG TABS No dose, route, or frequency recorded.    levonorgestrel (MIRENA) 52 MG (20 mcg/day) IUD Intrauterine, ONCE    melatonin 3 mg, Oral    Multiple Vitamin (MULTIVITAMIN ADULT PO) Oral, Greens      Wt Readings from Last 5 Encounters:   05/03/24 61.2 kg (135 lb)   09/19/23 61.1 kg (134 lb 11.2 oz)   08/17/22 61.1 kg (134 lb 12.8 oz)    08/09/22 60.8 kg (134 lb)   08/03/21 61.1 kg (134 lb 9.6 oz)      BP Readings from Last 6 Encounters:   05/03/24 114/72   09/19/23 (!) 151/78   08/17/22 120/68   05/14/20 109/83          Review of Systems  Constitutional, HEENT, cardiovascular, pulmonary, gi and gu systems are negative, except as otherwise noted.      Objective    LMP  (LMP Unknown)   There is no height or weight on file to calculate BMI.  Physical Exam   GENERAL: alert and no distress  EYES: Eyes grossly normal to inspection, PERRL and conjunctivae and sclerae normal  NECK: positive for slight asymmetry on right side of thyroid. Otherwise no adenopathy, masses, or scars  RESP: even, unlabored, no cough, rales, rhonchi or wheezes  CV: regular rate (slight bradycardia), normal rhythm, normal color / no pallor or cyanosis visible.  NEUROPSYCH: speech and mentation intact, affect normal, pleasant and cooperative though tearful throughout visit. Normal fine / gross motor coordination, normal gait.    Results for orders placed or performed in visit on 05/03/24 (from the past 24 hour(s))   CBC with platelets and differential    Narrative    The following orders were created for panel order CBC with platelets and differential.  Procedure                               Abnormality         Status                     ---------                               -----------         ------                     CBC with platelets and d...[[id number]]                      Final result                 Please view results for these tests on the individual orders.   CBC with platelets and differential   Result Value Ref Range    WBC Count 4.8 4.0 - 11.0 10e3/uL    RBC Count 4.14 3.80 - 5.20 10e6/uL    Hemoglobin 13.3 11.7 - 15.7 g/dL    Hematocrit 39.9 35.0 - 47.0 %    MCV 96 78 - 100 fL    MCH 32.1 26.5 - 33.0 pg    MCHC 33.3 31.5 - 36.5 g/dL    RDW 11.6 10.0 - 15.0 %    Platelet Count 237 150 - 450 10e3/uL    % Neutrophils 56 %    % Lymphocytes 36 %    % Monocytes 7 %     % Eosinophils 1 %    % Basophils 0 %    % Immature Granulocytes 0 %    Absolute Neutrophils 2.7 1.6 - 8.3 10e3/uL    Absolute Lymphocytes 1.7 0.8 - 5.3 10e3/uL    Absolute Monocytes 0.3 0.0 - 1.3 10e3/uL    Absolute Eosinophils 0.0 0.0 - 0.7 10e3/uL    Absolute Basophils 0.0 0.0 - 0.2 10e3/uL    Absolute Immature Granulocytes 0.0 <=0.4 10e3/uL           Signed Electronically by: VANDANA Garay CNP

## 2024-05-04 LAB
FERRITIN SERPL-MCNC: 105 NG/ML (ref 6–175)
IRON BINDING CAPACITY (ROCHE): 240 UG/DL (ref 240–430)
IRON SATN MFR SERPL: 40 % (ref 15–46)
IRON SERPL-MCNC: 97 UG/DL (ref 37–145)
MIS SERPL-MCNC: 0.05 NG/ML
TSH SERPL DL<=0.005 MIU/L-ACNC: 1.54 UIU/ML (ref 0.3–4.2)
VIT B12 SERPL-MCNC: 766 PG/ML (ref 232–1245)
VIT D+METAB SERPL-MCNC: 33 NG/ML (ref 20–50)

## 2024-05-09 ENCOUNTER — OFFICE VISIT (OUTPATIENT)
Dept: ORTHOPEDICS | Facility: CLINIC | Age: 44
End: 2024-05-09
Payer: COMMERCIAL

## 2024-05-09 DIAGNOSIS — M76.891 HAMSTRING TENDINITIS OF RIGHT THIGH: Primary | ICD-10-CM

## 2024-05-09 PROCEDURE — 99213 OFFICE O/P EST LOW 20 MIN: CPT | Performed by: FAMILY MEDICINE

## 2024-05-09 NOTE — PATIENT INSTRUCTIONS
# Chronic Right Hip Pain: Kathya Mathews  was seen today for posterior right hamstring tendon pain. Symptoms had been going on for 2+ years, worsening over the past 6 months limiting her ability to run. On examination there are positive findings of mild pain with palpation over the lateral portion of the ischial tuberosity, tight hamstrings. Imaging findings showed tendinosis over the hamstring tendon on ultrasound. Likely cause of patient's condition due to irritated hamstring tendon at the ischial tuberosity.  Counseled patient on nature of condition and treatment options.  Given this plan as below, follow-up 1 mon as needed     Image Findings: Calcification at the hamstring insertion soft  Treatment: Activities as tolerated, home exercises given today, work on hamstring flexibility, limit rubbing for the first couple of weeks  Job: As tolerated  Medications/Injections: Limited tylenol/ibuprofen for pain for 1-2 weeks, Topical Voltaren gel, right hip PRP injection at the ischial tuberosity  Follow-up: In one month if symptoms do not improve, sooner if worsening  Can consider tenotomy procedure    Please call [phone]   Ask for my team if you have any questions or concerns    If you have not yet received the influenza vaccine but would like to get one, please call  [phone] or you can schedule via [employer]    It was great seeing you again today!    Jalen Khan MD, CAQSM     PRP (Platelet Rich Plasma) Post-Procedure Instructions  1. Do not take anti-inflammatories (Motrin, Advil, Naprosyn, Naproxen, Aleve, Indocin, Mobic, Toradol, Voltaren, Arthrotec, Ibuprofen, Diclofenac) for 2 weeks after the procedure  2. You can take Tylenol up to 1000 mg / dose and no more than 3,000 mg / day  3. You will be given a prescription strength pain medication (Norco) to be used for severe pain.  No driving or alcohol while taking narcotics.  4. If pain is persistent after Tylenol and Norco you may apply ice to  the affected area for 20 minutes. Limit icing within the first 2 weeks.  5. Plan to rest the remainder of the day after the procedure.  6. A follow-up appointment should be scheduled for 1 week after the procedure.  7. Formal physical therapy, if needed, will begin 1-2 weeks after the procedure.  8. It can take up to 6-8 weeks to determine your full response to the PRP injection    Yoga Strap  3 sets of 30 sec on each side twice daily

## 2024-05-09 NOTE — LETTER
5/9/2024         RE: Kathya Mathews  [address] [facility] [address]        Dear Colleague,    Thank you for referring your patient, Kathya Mathews, to the [facility]. Please see a copy of my visit note below.    ASSESSMENT & PLAN    Kathya was seen today for pain.    Diagnoses and all orders for this visit:    Hamstring tendinitis of right thigh        # Chronic Right Hip Pain: Kathya Mathews  was seen today for posterior right hamstring tendon pain. Symptoms had been going on for 2+ years, worsening over the past 6 months limiting her ability to run. On examination there are positive findings of mild pain with palpation over the lateral portion of the ischial tuberosity, tight hamstrings. Imaging findings showed tendinosis over the hamstring tendon on ultrasound. Likely cause of patient's condition due to irritated hamstring tendon at the ischial tuberosity.  Counseled patient on nature of condition and treatment options.  Given this plan as below, follow-up 1 mon as needed     Image Findings: Calcification at the hamstring insertion soft  Treatment: Activities as tolerated, home exercises given today, work on hamstring flexibility, limit rubbing for the first couple of weeks  Job: As tolerated  Medications/Injections: Limited tylenol/ibuprofen for pain for 1-2 weeks, Topical Voltaren gel, right hip PRP injection at the ischial tuberosity  Follow-up: In one month if symptoms do not improve, sooner if worsening  Can consider tenotomy procedure    -----    SUBJECTIVE:  Kathya Mathews is a 43 year old female who is seen in follow-up for proximal hamstring pain. They were last seen 5/12/22 and right hamstring tendon PRP injection was performed.  The patient is seen by themselves.    Since their last visit reports returned buttock pain starting in January.  Pain with running, weight lifting, prolonged sitting, driving. Previous muscle belly strain in  the fall but self resolved.  They have tried right hamstring tendon PRP injection 22.        Patient's past medical, surgical, social, and family histories were reviewed today and no changes are noted.    REVIEW OF SYSTEMS:  Constitutional: NEGATIVE for fever, chills, change in weight  Skin: NEGATIVE for worrisome rashes, moles or lesions  GI/: NEGATIVE for bowel or bladder changes  Neuro: NEGATIVE for weakness, dizziness or paresthesias    OBJECTIVE:  LMP 2024 (Approximate)    General: healthy, alert and in no distress  HEENT: no scleral icterus or conjunctival erythema  Skin: no suspicious lesions or rash. No jaundice.  CV: regular rhythm by palpation, no pedal edema  Resp: normal respiratory effort without conversational dyspnea   Psych: normal mood and affect  Gait: normal steady gait with appropriate coordination and balance  Neuro: normal light touch sensory exam of the extremities.    MSK:    BILATERAL HIP  Inspection:    No swelling, bruising, discoloration, or obvious deformity or asymmetry  Palpation:    Tender about the ischial tuberosity. Otherwise all other landmarks are nontender.    Crepitus is none  Active Range of Motion:     Flexion full, extension full / IR full / ER  full  Strength:    Flexion 5/5 / extension 5/5 / adduction 5/5 / abduction 5/5  Special Tests:    Positive: mild pain with resisted knee flexion at ischial tuberosity    Negative: MYRIAM, anterior impingement (FADIR)    Independent visualization of the below image:     Reviewed right hip MRI    Jalen Khan MD, [facility]    Disclaimer: This note consists of symbols derived from keyboarding, dictation and/or voice recognition software. As a result, there may be errors in the script that have gone undetected. Please consider this when interpreting information found in this chart.        Kathya Mathews  :  1980  DOS: May 9, 2024  MRN: [id number]    [facility] Medicine Clinic  Procedure      Ultrasound Guided right proximal hamstring PRP Injection      Kathya Mathews has elected to receive a right Hip ischial tuberosity PRP injection to help with modulation of pain and to promote healing. Sonographic guidance will be used to ensure accurate placement of the medication into the hamstring origin.      Diagnosis: right hamstring tendinitis     PRP PROCEDURE:  The patient was prepped and approximately 60 cc of whole blood was withdrawn using a standard sterile technique via antecubiatal access of the arm. The whole blood was processed on location in the [employer] System and approximately 6 cc of Platelet Rich Plasma was obtained.      Technique: The risks of the procedure were explained to the patient.  A consent was signed for the right hamstring tendon origin PRP injection.  The patient was evaluated with a [employer] ultrasound machine using a curvilinear probe.       The right Hip was prepped and draped in a sterile manner.  Ultrasound identification of the hamstring tendon in both long and short axis.  Area blood vessels noted.  The probe was placed in a oblique-sagittal axis to the right Hip hamstring tendon.  A 3.5 inch 22 gauge needle was placed under ultrasound guidance into the right hamstring tendon.   A solution with total of volume of 6 cc PRP was injected into the right hamstring tendon in both long and short axis taking care to distribute the volume throughout all areas.  There was ultrasound documentation of needle placement and injection.        Impression:  Successful right hamstring tendon PRP injection.      Plan:  Follow up prn   Will provide update in 1-2 and 4 weeks  Discussed potential next steps based on clinical progress  Advised will likely be sore over next 2-14 days, OTC and supportive care reviewed  All questions were answered today  Contact us with additional questions or concerns  Signs and sx of concern reviewed      Jalen Khan MD CAQ,  MD  Primary Care Sports Medicine  [facility]        Again, thank you for allowing me to participate in the care of your patient.        Sincerely,        Jalen Khan MD

## 2024-05-09 NOTE — PROGRESS NOTES
ASSESSMENT & PLAN    Kathya was seen today for pain.    Diagnoses and all orders for this visit:    Hamstring tendinitis of right thigh        # Chronic Right Hip Pain: Kathya Mathews  was seen today for posterior right hamstring tendon pain. Symptoms had been going on for 2+ years, worsening over the past 6 months limiting her ability to run. On examination there are positive findings of mild pain with palpation over the lateral portion of the ischial tuberosity, tight hamstrings. Imaging findings showed tendinosis over the hamstring tendon on ultrasound. Likely cause of patient's condition due to irritated hamstring tendon at the ischial tuberosity.  Counseled patient on nature of condition and treatment options.  Given this plan as below, follow-up 1 mon as needed     Image Findings: Calcification at the hamstring insertion soft  Treatment: Activities as tolerated, home exercises given today, work on hamstring flexibility, limit rubbing for the first couple of weeks  Job: As tolerated  Medications/Injections: Limited tylenol/ibuprofen for pain for 1-2 weeks, Topical Voltaren gel, right hip PRP injection at the ischial tuberosity  Follow-up: In one month if symptoms do not improve, sooner if worsening  Can consider tenotomy procedure    -----    SUBJECTIVE:  Kathya Mathews is a 43 year old female who is seen in follow-up for proximal hamstring pain. They were last seen 5/12/22 and right hamstring tendon PRP injection was performed.  The patient is seen by themselves.    Since their last visit reports returned buttock pain starting in January.  Pain with running, weight lifting, prolonged sitting, driving. Previous muscle belly strain in the fall but self resolved.  They have tried right hamstring tendon PRP injection 5/12/22.        Patient's past medical, surgical, social, and family histories were reviewed today and no changes are noted.    REVIEW OF SYSTEMS:  Constitutional: NEGATIVE for fever,  chills, change in weight  Skin: NEGATIVE for worrisome rashes, moles or lesions  GI/: NEGATIVE for bowel or bladder changes  Neuro: NEGATIVE for weakness, dizziness or paresthesias    OBJECTIVE:  LMP 04/19/2024 (Approximate)    General: healthy, alert and in no distress  HEENT: no scleral icterus or conjunctival erythema  Skin: no suspicious lesions or rash. No jaundice.  CV: regular rhythm by palpation, no pedal edema  Resp: normal respiratory effort without conversational dyspnea   Psych: normal mood and affect  Gait: normal steady gait with appropriate coordination and balance  Neuro: normal light touch sensory exam of the extremities.    MSK:    BILATERAL HIP  Inspection:    No swelling, bruising, discoloration, or obvious deformity or asymmetry  Palpation:    Tender about the ischial tuberosity. Otherwise all other landmarks are nontender.    Crepitus is none  Active Range of Motion:     Flexion full, extension full / IR full / ER  full  Strength:    Flexion 5/5 / extension 5/5 / adduction 5/5 / abduction 5/5  Special Tests:    Positive: mild pain with resisted knee flexion at ischial tuberosity    Negative: MYRIAM, anterior impingement (FADIR)    Independent visualization of the below image:     Reviewed right hip MRI    Jalen Khan MD, [facility]    Disclaimer: This note consists of symbols derived from keyboarding, dictation and/or voice recognition software. As a result, there may be errors in the script that have gone undetected. Please consider this when interpreting information found in this chart.

## 2024-05-09 NOTE — PROGRESS NOTES
Kathya Mathews  :  1980  DOS: May 9, 2024  MRN: [id number]    [facility] Medicine Clinic Procedure      Ultrasound Guided right proximal hamstring PRP Injection      Kathya Mathews has elected to receive a right Hip ischial tuberosity PRP injection to help with modulation of pain and to promote healing. Sonographic guidance will be used to ensure accurate placement of the medication into the hamstring origin.      Diagnosis: right hamstring tendinitis     PRP PROCEDURE:  The patient was prepped and approximately 60 cc of whole blood was withdrawn using a standard sterile technique via antecubiatal access of the arm. The whole blood was processed on location in the [employer] System and approximately 6 cc of Platelet Rich Plasma was obtained.      Technique: The risks of the procedure were explained to the patient.  A consent was signed for the right hamstring tendon origin PRP injection.  The patient was evaluated with a [employer] ultrasound machine using a curvilinear probe.       The right Hip was prepped and draped in a sterile manner.  Ultrasound identification of the hamstring tendon in both long and short axis.  Area blood vessels noted.  The probe was placed in a oblique-sagittal axis to the right Hip hamstring tendon.  A 3.5 inch 22 gauge needle was placed under ultrasound guidance into the right hamstring tendon.   A solution with total of volume of 6 cc PRP was injected into the right hamstring tendon in both long and short axis taking care to distribute the volume throughout all areas.  There was ultrasound documentation of needle placement and injection.        Impression:  Successful right hamstring tendon PRP injection.      Plan:  Follow up prn   Will provide update in 1-2 and 4 weeks  Discussed potential next steps based on clinical progress  Advised will likely be sore over next 2-14 days, OTC and supportive care reviewed  All questions were answered today  Contact us with  additional questions or concerns  Signs and sx of concern reviewed      Jalen Khan MD CAQ, MD  Primary Care Sports Medicine  [facility]

## 2024-05-30 NOTE — TELEPHONE ENCOUNTER
Attempted to contact patient in order to complete pre assessment questions.     No answer. Left message to return call to [phone] option 4    Callback required communication sent via [facility].      Priscila Chou RN  Endoscopy Procedure Pre Assessment

## 2024-05-30 NOTE — TELEPHONE ENCOUNTER
Rescheduled Colonoscopy    Pre visit planning completed.      Procedure details:    Patient scheduled for Colonoscopy  on 6/5/24.     Arrival time: 0700. Procedure time 0800    Facility location: [facility]; [address] [place] [address], 5[address] Floor, [place] [address]. Check in location: 5th Floor.    Sedation type: Conscious sedation     Pre op exam needed? N/A    Indication for procedure: Screening      Chart review:     Electronic implanted devices? No    Recent diagnosis of diverticulitis within the last 6 weeks? No    Diabetic? No      Medication review:    Anticoagulants? No    NSAIDS? Yes.  Ibuprofen (Advil, Motrin).  Holding interval of 1 day before procedure.    Other medication HOLDING recommendations:  Ferrous sulfate (iron supplements): HOLD 7 days before procedure.      Prep for procedure:     Bowel prep recommendation: Standard Miralax  Due to: standard bowel prep.    Prep instructions sent via [employer]         Priscila Hammer RN  Endoscopy Procedure Pre Assessment RN  [phone] option 4

## 2024-06-03 NOTE — TELEPHONE ENCOUNTER
Pre assessment completed for upcoming procedure.      Procedure details:    Patient scheduled for Colonoscopy  on 6/5/24.     Arrival time: 0700. Procedure time 0800     Facility location: [facility]; [address] [place] [address], 5[address] Floor, [place] [address]. Check in location: 5th Floor.     Sedation type: Conscious sedation     Designated  policy reviewed. Instructed to have someone stay 6 hours post procedure.         Medication review:    NSAIDS? Yes.  Ibuprofen (Advil, Motrin).  Holding interval of 1 day before procedure.    Other medication HOLDING recommendations:  Ferrous sulfate (iron supplements): HOLD 7 days before procedure.      Prep for procedure:     Reviewed procedure prep instructions.     Patient verbalized understanding and had no questions or concerns at this time.        Priscila Hammer RN  Endoscopy Procedure Pre Assessment   [phone] option 4

## 2024-06-05 ENCOUNTER — HOSPITAL ENCOUNTER (OUTPATIENT)
Facility: AMBULATORY SURGERY CENTER | Age: 44
Discharge: HOME | End: 2024-06-05
Attending: SURGERY | Admitting: INTERNAL MEDICINE
Payer: COMMERCIAL

## 2024-06-05 VITALS
RESPIRATION RATE: 16 BRPM | BODY MASS INDEX: 21.19 KG/M2 | OXYGEN SATURATION: 100 % | SYSTOLIC BLOOD PRESSURE: 109 MMHG | HEART RATE: 63 BPM | TEMPERATURE: 97.4 F | DIASTOLIC BLOOD PRESSURE: 64 MMHG | WEIGHT: 135 LBS | HEIGHT: 67 IN

## 2024-06-05 DIAGNOSIS — Z12.11 SCREENING FOR COLON CANCER: Primary | ICD-10-CM

## 2024-06-05 LAB — COLONOSCOPY: NORMAL

## 2024-06-05 PROCEDURE — 45378 DIAGNOSTIC COLONOSCOPY: CPT | Mod: 33 | Performed by: SURGERY

## 2024-06-05 RX ORDER — FENTANYL CITRATE 50 UG/ML
INJECTION, SOLUTION INTRAMUSCULAR; INTRAVENOUS DAILY PRN
Status: DISCONTINUED | OUTPATIENT
Start: 2024-06-05 | End: 2024-06-05 | Stop reason: HOSPADM

## 2024-06-05 RX ORDER — ONDANSETRON 2 MG/ML
4 INJECTION INTRAMUSCULAR; INTRAVENOUS EVERY 6 HOURS PRN
Status: DISCONTINUED | OUTPATIENT
Start: 2024-06-05 | End: 2024-06-06 | Stop reason: HOSPADM

## 2024-06-05 RX ORDER — PROCHLORPERAZINE MALEATE 10 MG
10 TABLET ORAL EVERY 6 HOURS PRN
Status: DISCONTINUED | OUTPATIENT
Start: 2024-06-05 | End: 2024-06-06 | Stop reason: HOSPADM

## 2024-06-05 RX ORDER — ONDANSETRON 2 MG/ML
4 INJECTION INTRAMUSCULAR; INTRAVENOUS
Status: DISCONTINUED | OUTPATIENT
Start: 2024-06-05 | End: 2024-06-05 | Stop reason: HOSPADM

## 2024-06-05 RX ORDER — NALOXONE HYDROCHLORIDE 0.4 MG/ML
0.2 INJECTION, SOLUTION INTRAMUSCULAR; INTRAVENOUS; SUBCUTANEOUS
Status: DISCONTINUED | OUTPATIENT
Start: 2024-06-05 | End: 2024-06-06 | Stop reason: HOSPADM

## 2024-06-05 RX ORDER — LIDOCAINE 40 MG/G
CREAM TOPICAL
Status: DISCONTINUED | OUTPATIENT
Start: 2024-06-05 | End: 2024-06-05 | Stop reason: HOSPADM

## 2024-06-05 RX ORDER — FLUMAZENIL 0.1 MG/ML
0.2 INJECTION, SOLUTION INTRAVENOUS
Status: ACTIVE | OUTPATIENT
Start: 2024-06-05 | End: 2024-06-05

## 2024-06-05 RX ORDER — NALOXONE HYDROCHLORIDE 0.4 MG/ML
0.4 INJECTION, SOLUTION INTRAMUSCULAR; INTRAVENOUS; SUBCUTANEOUS
Status: DISCONTINUED | OUTPATIENT
Start: 2024-06-05 | End: 2024-06-06 | Stop reason: HOSPADM

## 2024-06-05 RX ORDER — SODIUM CHLORIDE, SODIUM LACTATE, POTASSIUM CHLORIDE, CALCIUM CHLORIDE 600; 310; 30; 20 MG/100ML; MG/100ML; MG/100ML; MG/100ML
INJECTION, SOLUTION INTRAVENOUS CONTINUOUS
Status: DISCONTINUED | OUTPATIENT
Start: 2024-06-05 | End: 2024-06-05 | Stop reason: HOSPADM

## 2024-06-05 RX ORDER — ONDANSETRON 4 MG/1
4 TABLET, ORALLY DISINTEGRATING ORAL EVERY 6 HOURS PRN
Status: DISCONTINUED | OUTPATIENT
Start: 2024-06-05 | End: 2024-06-06 | Stop reason: HOSPADM

## 2024-06-05 NOTE — DISCHARGE INSTRUCTIONS
Discharge Instructions after Colonoscopy    Today you had a ____ Colonoscopy     Activity and Diet  You were given medicine for pain. You may be dizzy or sleepy.  For 24 hours:   Do not drive or use heavy equipment.   Do not make important decisions.   Do not drink any alcohol.  You may return to your normal diet and medicines.    Discomfort   Air was placed in your colon during the exam in order to see it. Walking helps to pass the air.   You may take Tylenol (acetaminophen) for pain unless your doctor has told you not to.  Do not take aspirin or ibuprofen (Advil, Motrin, or other anti-inflammatory  drugs) for _____ days.    Follow-up  ____ We took small tissue samples or polyps to study. Your doctor will call you with the results  within two weeks.    When to call:    Call right away if you have:   Unusual pain in belly or chest pain not relieved with passing air.   More than 1 to 2 Tablespoons of bleeding from your rectum.   Fever above 100.6  F (37.5  C).    If you have severe pain, bleeding, or shortness of breath, go to an emergency room.    If you have questions, call:  Monday to Friday, 8 a.m. to 4:30 p.m.  Central Scheduling Department: [phone]    After hours  Hospital: [phone] (Ask for the GI fellow on call)

## 2024-09-06 ENCOUNTER — PATIENT OUTREACH (OUTPATIENT)
Dept: FAMILY MEDICINE | Facility: CLINIC | Age: 44
End: 2024-09-06
Payer: COMMERCIAL

## 2024-09-06 DIAGNOSIS — Z98.890 H/O LEEP: Primary | ICD-10-CM

## 2024-11-02 ENCOUNTER — HEALTH MAINTENANCE LETTER (OUTPATIENT)
Age: 44
End: 2024-11-02

## 2024-11-14 ENCOUNTER — VIRTUAL VISIT (OUTPATIENT)
Dept: FAMILY MEDICINE | Facility: CLINIC | Age: 44
End: 2024-11-14
Payer: COMMERCIAL

## 2024-11-14 DIAGNOSIS — Z12.31 VISIT FOR SCREENING MAMMOGRAM: ICD-10-CM

## 2024-11-14 DIAGNOSIS — E61.1 IRON DEFICIENCY: ICD-10-CM

## 2024-11-14 DIAGNOSIS — F33.8 SEASONAL AFFECTIVE DISORDER (H): ICD-10-CM

## 2024-11-14 DIAGNOSIS — F33.41 MAJOR DEPRESSIVE DISORDER, RECURRENT EPISODE, IN PARTIAL REMISSION (H): Primary | ICD-10-CM

## 2024-11-14 PROCEDURE — G2211 COMPLEX E/M VISIT ADD ON: HCPCS | Mod: 95 | Performed by: PHYSICIAN ASSISTANT

## 2024-11-14 PROCEDURE — 99214 OFFICE O/P EST MOD 30 MIN: CPT | Mod: 95 | Performed by: PHYSICIAN ASSISTANT

## 2024-11-14 RX ORDER — BUPROPION HYDROCHLORIDE 300 MG/1
300 TABLET ORAL DAILY
Qty: 90 TABLET | Refills: 3 | Status: SHIPPED | OUTPATIENT
Start: 2024-11-14

## 2024-11-14 ASSESSMENT — PATIENT HEALTH QUESTIONNAIRE - PHQ9
SUM OF ALL RESPONSES TO PHQ QUESTIONS 1-9: 6
10. IF YOU CHECKED OFF ANY PROBLEMS, HOW DIFFICULT HAVE THESE PROBLEMS MADE IT FOR YOU TO DO YOUR WORK, TAKE CARE OF THINGS AT HOME, OR GET ALONG WITH OTHER PEOPLE: SOMEWHAT DIFFICULT
SUM OF ALL RESPONSES TO PHQ QUESTIONS 1-9: 6

## 2024-11-14 NOTE — PROGRESS NOTES
Kathya is a 44 year old who is being evaluated via a billable video visit.    How would you like to obtain your AVS? MyChart  If the video visit is dropped, the invitation should be resent by: Text to cell phone: [phone]  Will anyone else be joining your video visit? No      Assessment & Plan     Major depressive disorder, recurrent episode, in partial remission (H)  Seasonal affective disorder (H)    Wellbutrin is effective for her.  Plan to continue with 300 mg daily.  Did reviewed potential option for starting SSRI with this however she declines.  If she decides to discontinue the medication in the spring/summer when mood improves recommended that she reach out to me to receive 150 mg tablets to gradually taper to avoid potential side effects.  Option for ADHD testing in future.  Will send her a list of mental health resources as well as encouraged her to look through her employers insurance at the [facility] to see if there are any covered options.    - CBC with platelets and differential  - Ferritin  - Iron and iron binding capacity  - buPROPion (WELLBUTRIN XL) 300 MG 24 hr tablet  Dispense: 90 tablet; Refill: 3    Iron deficiency  Labs ordered for future  - CBC with platelets and differential  - Ferritin  - Iron and iron binding capacity    Visit for screening mammogram  Mammogram ordered  - MA Screening Bilateral w/ Renaldo      30 minutes spent by me on the date of the encounter doing chart review, review of test results, interpretation of tests, patient visit, and documentation     The longitudinal plan of care for the diagnosis(es)/condition(s) as documented were addressed during this visit. Due to the added complexity in care, I will continue to support Kathya in the subsequent management and with ongoing continuity of care.    Subjective   Kathya is a pleasant 44 year old, presenting for the following health issues:  Refill Request    Here today for medication follow-up.  History of depression as well as  seasonal affective disorder for which she takes Wellbutrin 300 mg.  Also believes there is some component of ADHD.  Daughter with history of ADHD and she was previously set up for testing but unfortunately canceled her appointment.  Unfortunately over the past 2 springs has abruptly stopped Wellbutrin in the spring which led to some significant side effects including depression.  She was on SSRI in her 20s but unfortunately experienced GI side effects as well as sexual side effects which were intolerable.  Does not see a therapist.    Labs completed in May which were reviewed.  Does have a history of iron deficiency anemia which she is requesting testing for.  She is very active runner, runs approximately 80 miles per week.    Due for mammogram        11/14/2024     2:11 PM   Additional Questions   Roomed by Maura   Accompanied by Not applicable, by themselves     History of Present Illness       Reason for visit:  Refills,   She is taking medications regularly.         Review of Systems  Constitutional, neuro, ENT, endocrine, pulmonary, cardiac, gastrointestinal, genitourinary, musculoskeletal, integument and psychiatric systems are negative, except as otherwise noted.      Objective           Vitals:  No vitals were obtained today due to virtual visit.    Physical Exam   GENERAL: alert and no distress  EYES: Eyes grossly normal to inspection.  No discharge or erythema, or obvious scleral/conjunctival abnormalities.  RESP: No audible wheeze, cough, or visible cyanosis.    SKIN: Visible skin clear. No significant rash, abnormal pigmentation or lesions.  NEURO: Cranial nerves grossly intact.  Mentation and speech appropriate for age.  PSYCH: Appropriate affect, tone, and pace of words          Video-Visit Details    Type of service:  Video Visit   Originating Location (pt. Location): Home    Distant Location (provider location):  On-site  Platform used for Video Visit: Patricia  Signed Electronically by: Howard  AMARILIS Leyva

## 2024-11-15 ENCOUNTER — PATIENT OUTREACH (OUTPATIENT)
Dept: CARE COORDINATION | Facility: CLINIC | Age: 44
End: 2024-11-15
Payer: COMMERCIAL

## 2025-05-18 ENCOUNTER — OFFICE VISIT (OUTPATIENT)
Dept: URGENT CARE | Facility: URGENT CARE | Age: 45
End: 2025-05-18
Payer: COMMERCIAL

## 2025-05-18 ENCOUNTER — ANCILLARY PROCEDURE (OUTPATIENT)
Dept: GENERAL RADIOLOGY | Facility: CLINIC | Age: 45
End: 2025-05-18
Attending: INTERNAL MEDICINE
Payer: COMMERCIAL

## 2025-05-18 VITALS
OXYGEN SATURATION: 100 % | DIASTOLIC BLOOD PRESSURE: 81 MMHG | TEMPERATURE: 98.1 F | RESPIRATION RATE: 16 BRPM | HEART RATE: 55 BPM | SYSTOLIC BLOOD PRESSURE: 138 MMHG

## 2025-05-18 DIAGNOSIS — S93.491A SPRAIN OF ANTERIOR TALOFIBULAR LIGAMENT OF RIGHT ANKLE, INITIAL ENCOUNTER: Primary | ICD-10-CM

## 2025-05-18 DIAGNOSIS — S99.911A ANKLE INJURY, RIGHT, INITIAL ENCOUNTER: ICD-10-CM

## 2025-05-18 PROCEDURE — 3075F SYST BP GE 130 - 139MM HG: CPT | Performed by: INTERNAL MEDICINE

## 2025-05-18 PROCEDURE — 73610 X-RAY EXAM OF ANKLE: CPT | Mod: TC | Performed by: RADIOLOGY

## 2025-05-18 PROCEDURE — 99213 OFFICE O/P EST LOW 20 MIN: CPT | Performed by: INTERNAL MEDICINE

## 2025-05-18 PROCEDURE — 3079F DIAST BP 80-89 MM HG: CPT | Performed by: INTERNAL MEDICINE

## 2025-05-18 NOTE — PROGRESS NOTES
SUBJECTIVE:  Chief complaint of injury to the right ankle.  Had rolled this while hiking on [place] yesterday.  Was able to walk on this.  Can bear weight today.  Prior history of multiple foot surgeries and ankle fracture which required ORIF.      OBJECTIVE:  /81 (BP Location: Right arm, Patient Position: Sitting, Cuff Size: Adult Regular)   Pulse 55   Temp 98.1  F (36.7  C) (Oral)   Resp 16   SpO2 100%   GEN: alert and interactive  RIGHT ANKLE: the entire foot is edematous with more focal swelling of the lateral malleolus with violaceous discoloration; patient denies focal tenderness of the lateral malleolus, medial malleolus, base of the fifth metatarsal; walks 10 feet with normal gait.    Plain films of the right ankle, which I have personally reviewed and interpreted, show prior hardware but no evidence of any new fracture.    ASSESSMENT/PLAN:    ICD-10-CM    1. Sprain of anterior talofibular ligament of right ankle, initial encounter  S93.491A       2. Ankle injury, right, initial encounter  S99.911A XR Ankle Right G/E 3 Views      The patient has a walking boot at home form her prior ankle surgery.  Will use this for immobilization and rehab.  Discussed process for rehabbing ankle sprain.     Luisito Urbina MD

## 2025-07-06 ENCOUNTER — HEALTH MAINTENANCE LETTER (OUTPATIENT)
Age: 45
End: 2025-07-06

## 2025-08-13 ENCOUNTER — MYC MEDICAL ADVICE (OUTPATIENT)
Dept: FAMILY MEDICINE | Facility: CLINIC | Age: 45
End: 2025-08-13
Payer: COMMERCIAL

## 2025-08-13 DIAGNOSIS — R53.83 FATIGUE, UNSPECIFIED TYPE: ICD-10-CM

## 2025-08-13 DIAGNOSIS — E61.1 IRON DEFICIENCY: Primary | ICD-10-CM

## 2025-08-16 ENCOUNTER — LAB (OUTPATIENT)
Dept: LAB | Facility: CLINIC | Age: 45
End: 2025-08-16
Attending: PHYSICIAN ASSISTANT
Payer: COMMERCIAL

## 2025-08-16 DIAGNOSIS — Z11.59 NEED FOR HEPATITIS C SCREENING TEST: ICD-10-CM

## 2025-08-16 DIAGNOSIS — Z13.1 SCREENING FOR DIABETES MELLITUS: Primary | ICD-10-CM

## 2025-08-28 ENCOUNTER — LAB (OUTPATIENT)
Dept: LAB | Facility: CLINIC | Age: 45
End: 2025-08-28
Payer: COMMERCIAL

## 2025-08-28 DIAGNOSIS — Z11.59 NEED FOR HEPATITIS C SCREENING TEST: ICD-10-CM

## 2025-08-28 DIAGNOSIS — Z13.1 SCREENING FOR DIABETES MELLITUS: ICD-10-CM

## 2025-08-28 DIAGNOSIS — R53.83 FATIGUE, UNSPECIFIED TYPE: ICD-10-CM

## 2025-08-28 DIAGNOSIS — E61.1 IRON DEFICIENCY: ICD-10-CM

## 2025-08-28 DIAGNOSIS — F33.41 MAJOR DEPRESSIVE DISORDER, RECURRENT EPISODE, IN PARTIAL REMISSION: ICD-10-CM

## 2025-08-28 LAB
BASOPHILS # BLD AUTO: <0.04 10E3/UL (ref 0–0.2)
BASOPHILS NFR BLD AUTO: 0.4 %
EOSINOPHIL # BLD AUTO: 0.05 10E3/UL (ref 0–0.7)
EOSINOPHIL NFR BLD AUTO: 0.9 %
ERYTHROCYTE [DISTWIDTH] IN BLOOD BY AUTOMATED COUNT: 12.4 % (ref 10–15)
HCT VFR BLD AUTO: 41 % (ref 35–47)
HGB BLD-MCNC: 13.6 G/DL (ref 11.7–15.7)
IMM GRANULOCYTES # BLD: <0.04 10E3/UL
IMM GRANULOCYTES NFR BLD: 0 %
LYMPHOCYTES # BLD AUTO: 2.64 10E3/UL (ref 0.8–5.3)
LYMPHOCYTES NFR BLD AUTO: 47.7 %
MCH RBC QN AUTO: 32.5 PG (ref 26.5–33)
MCHC RBC AUTO-ENTMCNC: 33.2 G/DL (ref 31.5–36.5)
MCV RBC AUTO: 97.9 FL (ref 78–100)
MONOCYTES # BLD AUTO: 0.38 10E3/UL (ref 0–1.3)
MONOCYTES NFR BLD AUTO: 6.9 %
NEUTROPHILS # BLD AUTO: 2.45 10E3/UL (ref 1.6–8.3)
NEUTROPHILS NFR BLD AUTO: 44.1 %
PLATELET # BLD AUTO: 237 10E3/UL (ref 150–450)
RBC # BLD AUTO: 4.19 10E6/UL (ref 3.8–5.2)
WBC # BLD AUTO: 5.54 10E3/UL (ref 4–11)

## (undated) DEVICE — KIT ENDO FIRST STEP DISINFECTANT 200ML W/POUCH EP-4

## (undated) DEVICE — KIT ENDO TURNOVER/PROCEDURE CARRY-ON 101822

## (undated) DEVICE — TUBING SUCTION MEDI-VAC 1/4"X20' N620A

## (undated) DEVICE — SUCTION MANIFOLD NEPTUNE 2 SYS 1 PORT 702-025-000

## (undated) DEVICE — PAD CHUX UNDERPAD 30X36" P3036C

## (undated) DEVICE — DRSG GAUZE 4X4" TRAY 6939

## (undated) DEVICE — GOWN IMPERVIOUS 2XL BLUE

## (undated) DEVICE — WIPE PREMOIST CLEANSING WASHCLOTHS 7988

## (undated) DEVICE — SPECIMEN CONTAINER 3OZ W/FORMALIN 59901

## (undated) DEVICE — SOL WATER IRRIG 500ML BOTTLE 2F7113

## (undated) RX ORDER — DIPHENHYDRAMINE HYDROCHLORIDE 50 MG/ML
INJECTION INTRAMUSCULAR; INTRAVENOUS
Status: DISPENSED
Start: 2024-06-05

## (undated) RX ORDER — ONDANSETRON 2 MG/ML
INJECTION INTRAMUSCULAR; INTRAVENOUS
Status: DISPENSED
Start: 2024-06-05

## (undated) RX ORDER — LIDOCAINE HYDROCHLORIDE 10 MG/ML
INJECTION, SOLUTION EPIDURAL; INFILTRATION; INTRACAUDAL; PERINEURAL
Status: DISPENSED
Start: 2020-05-14

## (undated) RX ORDER — FENTANYL CITRATE 50 UG/ML
INJECTION, SOLUTION INTRAMUSCULAR; INTRAVENOUS
Status: DISPENSED
Start: 2024-06-05

## (undated) RX ORDER — EPINEPHRINE 1 MG/ML
INJECTION, SOLUTION, CONCENTRATE INTRAVENOUS
Status: DISPENSED
Start: 2020-05-14